# Patient Record
Sex: MALE | Race: WHITE | NOT HISPANIC OR LATINO | Employment: FULL TIME | URBAN - METROPOLITAN AREA
[De-identification: names, ages, dates, MRNs, and addresses within clinical notes are randomized per-mention and may not be internally consistent; named-entity substitution may affect disease eponyms.]

---

## 2024-06-02 ENCOUNTER — APPOINTMENT (OUTPATIENT)
Dept: CT IMAGING | Facility: CLINIC | Age: 59
DRG: 063 | End: 2024-06-02
Attending: EMERGENCY MEDICINE
Payer: COMMERCIAL

## 2024-06-02 ENCOUNTER — HOSPITAL ENCOUNTER (INPATIENT)
Facility: CLINIC | Age: 59
LOS: 2 days | Discharge: HOME | DRG: 063 | End: 2024-06-05
Attending: EMERGENCY MEDICINE | Admitting: HOSPITALIST
Payer: COMMERCIAL

## 2024-06-02 DIAGNOSIS — R47.1 DYSARTHRIA: ICD-10-CM

## 2024-06-02 DIAGNOSIS — R29.810 FACIAL DROOP: ICD-10-CM

## 2024-06-02 DIAGNOSIS — R53.1 RIGHT SIDED WEAKNESS: ICD-10-CM

## 2024-06-02 DIAGNOSIS — R47.01 EXPRESSIVE APHASIA: ICD-10-CM

## 2024-06-02 DIAGNOSIS — I48.91 ATRIAL FIBRILLATION WITH RVR (H): ICD-10-CM

## 2024-06-02 DIAGNOSIS — I63.9 ACUTE CVA (CEREBROVASCULAR ACCIDENT) (H): Primary | ICD-10-CM

## 2024-06-02 LAB
ANION GAP SERPL CALCULATED.3IONS-SCNC: 13 MMOL/L (ref 7–15)
APTT PPP: 32 SECONDS (ref 22–38)
BASOPHILS # BLD AUTO: 0.1 10E3/UL (ref 0–0.2)
BASOPHILS NFR BLD AUTO: 1 %
BUN SERPL-MCNC: 14.8 MG/DL (ref 8–23)
CALCIUM SERPL-MCNC: 8.9 MG/DL (ref 8.6–10)
CHLORIDE SERPL-SCNC: 106 MMOL/L (ref 98–107)
CREAT SERPL-MCNC: 1.1 MG/DL (ref 0.67–1.17)
DEPRECATED HCO3 PLAS-SCNC: 22 MMOL/L (ref 22–29)
EGFRCR SERPLBLD CKD-EPI 2021: 77 ML/MIN/1.73M2
EOSINOPHIL # BLD AUTO: 0.2 10E3/UL (ref 0–0.7)
EOSINOPHIL NFR BLD AUTO: 2 %
ERYTHROCYTE [DISTWIDTH] IN BLOOD BY AUTOMATED COUNT: 12.3 % (ref 10–15)
GLUCOSE SERPL-MCNC: 125 MG/DL (ref 70–99)
HCT VFR BLD AUTO: 45.7 % (ref 40–53)
HGB BLD-MCNC: 16.2 G/DL (ref 13.3–17.7)
IMM GRANULOCYTES # BLD: 0.1 10E3/UL
IMM GRANULOCYTES NFR BLD: 1 %
INR PPP: 1.15 (ref 0.85–1.15)
LYMPHOCYTES # BLD AUTO: 3.1 10E3/UL (ref 0.8–5.3)
LYMPHOCYTES NFR BLD AUTO: 25 %
MCH RBC QN AUTO: 30.3 PG (ref 26.5–33)
MCHC RBC AUTO-ENTMCNC: 35.4 G/DL (ref 31.5–36.5)
MCV RBC AUTO: 86 FL (ref 78–100)
MONOCYTES # BLD AUTO: 1.3 10E3/UL (ref 0–1.3)
MONOCYTES NFR BLD AUTO: 10 %
NEUTROPHILS # BLD AUTO: 7.7 10E3/UL (ref 1.6–8.3)
NEUTROPHILS NFR BLD AUTO: 61 %
NRBC # BLD AUTO: 0 10E3/UL
NRBC BLD AUTO-RTO: 0 /100
PLATELET # BLD AUTO: 128 10E3/UL (ref 150–450)
POTASSIUM SERPL-SCNC: 4 MMOL/L (ref 3.4–5.3)
RBC # BLD AUTO: 5.34 10E6/UL (ref 4.4–5.9)
SODIUM SERPL-SCNC: 141 MMOL/L (ref 135–145)
TROPONIN T SERPL HS-MCNC: 57 NG/L
WBC # BLD AUTO: 12.4 10E3/UL (ref 4–11)

## 2024-06-02 PROCEDURE — 96374 THER/PROPH/DIAG INJ IV PUSH: CPT

## 2024-06-02 PROCEDURE — 85730 THROMBOPLASTIN TIME PARTIAL: CPT | Performed by: EMERGENCY MEDICINE

## 2024-06-02 PROCEDURE — 70450 CT HEAD/BRAIN W/O DYE: CPT

## 2024-06-02 PROCEDURE — 99291 CRITICAL CARE FIRST HOUR: CPT | Mod: G0 | Performed by: PSYCHIATRY & NEUROLOGY

## 2024-06-02 PROCEDURE — 84484 ASSAY OF TROPONIN QUANT: CPT | Performed by: EMERGENCY MEDICINE

## 2024-06-02 PROCEDURE — 99291 CRITICAL CARE FIRST HOUR: CPT | Mod: 25

## 2024-06-02 PROCEDURE — 70496 CT ANGIOGRAPHY HEAD: CPT

## 2024-06-02 PROCEDURE — 250N000011 HC RX IP 250 OP 636: Performed by: EMERGENCY MEDICINE

## 2024-06-02 PROCEDURE — 85025 COMPLETE CBC W/AUTO DIFF WBC: CPT | Performed by: EMERGENCY MEDICINE

## 2024-06-02 PROCEDURE — 83036 HEMOGLOBIN GLYCOSYLATED A1C: CPT | Performed by: HOSPITALIST

## 2024-06-02 PROCEDURE — 80048 BASIC METABOLIC PNL TOTAL CA: CPT | Performed by: EMERGENCY MEDICINE

## 2024-06-02 PROCEDURE — 250N000011 HC RX IP 250 OP 636: Performed by: STUDENT IN AN ORGANIZED HEALTH CARE EDUCATION/TRAINING PROGRAM

## 2024-06-02 PROCEDURE — 82465 ASSAY BLD/SERUM CHOLESTEROL: CPT | Performed by: HOSPITALIST

## 2024-06-02 PROCEDURE — 85610 PROTHROMBIN TIME: CPT | Performed by: EMERGENCY MEDICINE

## 2024-06-02 PROCEDURE — 250N000009 HC RX 250: Performed by: STUDENT IN AN ORGANIZED HEALTH CARE EDUCATION/TRAINING PROGRAM

## 2024-06-02 PROCEDURE — 36415 COLL VENOUS BLD VENIPUNCTURE: CPT | Performed by: EMERGENCY MEDICINE

## 2024-06-02 PROCEDURE — 250N000009 HC RX 250: Performed by: EMERGENCY MEDICINE

## 2024-06-02 RX ORDER — LABETALOL HYDROCHLORIDE 5 MG/ML
10 INJECTION, SOLUTION INTRAVENOUS EVERY 10 MIN PRN
Status: DISCONTINUED | OUTPATIENT
Start: 2024-06-02 | End: 2024-06-05 | Stop reason: HOSPADM

## 2024-06-02 RX ORDER — HYDRALAZINE HYDROCHLORIDE 20 MG/ML
10 INJECTION INTRAMUSCULAR; INTRAVENOUS EVERY 10 MIN PRN
Status: DISCONTINUED | OUTPATIENT
Start: 2024-06-02 | End: 2024-06-05 | Stop reason: HOSPADM

## 2024-06-02 RX ORDER — SODIUM CHLORIDE 9 MG/ML
INJECTION, SOLUTION INTRAVENOUS CONTINUOUS PRN
Status: DISCONTINUED | OUTPATIENT
Start: 2024-06-02 | End: 2024-06-05 | Stop reason: HOSPADM

## 2024-06-02 RX ORDER — IOPAMIDOL 755 MG/ML
67 INJECTION, SOLUTION INTRAVASCULAR ONCE
Status: COMPLETED | OUTPATIENT
Start: 2024-06-02 | End: 2024-06-02

## 2024-06-02 RX ORDER — METOPROLOL SUCCINATE 50 MG/1
50 TABLET, EXTENDED RELEASE ORAL DAILY
Status: ON HOLD | COMMUNITY
End: 2024-06-05

## 2024-06-02 RX ADMIN — SODIUM CHLORIDE 100 ML: 9 INJECTION, SOLUTION INTRAVENOUS at 23:28

## 2024-06-02 RX ADMIN — IOPAMIDOL 67 ML: 755 INJECTION, SOLUTION INTRAVENOUS at 23:28

## 2024-06-02 RX ADMIN — Medication 25 MG: at 23:58

## 2024-06-03 ENCOUNTER — APPOINTMENT (OUTPATIENT)
Dept: CT IMAGING | Facility: CLINIC | Age: 59
DRG: 063 | End: 2024-06-03
Attending: NURSE PRACTITIONER
Payer: COMMERCIAL

## 2024-06-03 ENCOUNTER — APPOINTMENT (OUTPATIENT)
Dept: OCCUPATIONAL THERAPY | Facility: CLINIC | Age: 59
DRG: 063 | End: 2024-06-03
Attending: HOSPITALIST
Payer: COMMERCIAL

## 2024-06-03 ENCOUNTER — APPOINTMENT (OUTPATIENT)
Dept: CARDIOLOGY | Facility: CLINIC | Age: 59
DRG: 063 | End: 2024-06-03
Attending: HOSPITALIST
Payer: COMMERCIAL

## 2024-06-03 ENCOUNTER — APPOINTMENT (OUTPATIENT)
Dept: CT IMAGING | Facility: CLINIC | Age: 59
DRG: 063 | End: 2024-06-03
Attending: HOSPITALIST
Payer: COMMERCIAL

## 2024-06-03 ENCOUNTER — APPOINTMENT (OUTPATIENT)
Dept: MRI IMAGING | Facility: CLINIC | Age: 59
DRG: 063 | End: 2024-06-03
Attending: HOSPITALIST
Payer: COMMERCIAL

## 2024-06-03 PROBLEM — I63.9 ACUTE CVA (CEREBROVASCULAR ACCIDENT) (H): Status: ACTIVE | Noted: 2024-06-03

## 2024-06-03 LAB
ANION GAP SERPL CALCULATED.3IONS-SCNC: 14 MMOL/L (ref 7–15)
ATRIAL RATE - MUSE: NORMAL BPM
BASOPHILS # BLD AUTO: 0.1 10E3/UL (ref 0–0.2)
BASOPHILS NFR BLD AUTO: 1 %
BUN SERPL-MCNC: 14.3 MG/DL (ref 8–23)
CALCIUM SERPL-MCNC: 9 MG/DL (ref 8.6–10)
CHLORIDE SERPL-SCNC: 106 MMOL/L (ref 98–107)
CHOLEST SERPL-MCNC: 180 MG/DL
CREAT SERPL-MCNC: 1.03 MG/DL (ref 0.67–1.17)
DEPRECATED HCO3 PLAS-SCNC: 20 MMOL/L (ref 22–29)
DIASTOLIC BLOOD PRESSURE - MUSE: NORMAL MMHG
EGFRCR SERPLBLD CKD-EPI 2021: 84 ML/MIN/1.73M2
EOSINOPHIL # BLD AUTO: 0.2 10E3/UL (ref 0–0.7)
EOSINOPHIL NFR BLD AUTO: 2 %
ERYTHROCYTE [DISTWIDTH] IN BLOOD BY AUTOMATED COUNT: 12.7 % (ref 10–15)
GLUCOSE BLDC GLUCOMTR-MCNC: 105 MG/DL (ref 70–99)
GLUCOSE BLDC GLUCOMTR-MCNC: 109 MG/DL (ref 70–99)
GLUCOSE BLDC GLUCOMTR-MCNC: 112 MG/DL (ref 70–99)
GLUCOSE SERPL-MCNC: 141 MG/DL (ref 70–99)
HBA1C MFR BLD: 5.1 %
HCT VFR BLD AUTO: 49.1 % (ref 40–53)
HDLC SERPL-MCNC: 29 MG/DL
HGB BLD-MCNC: 17.2 G/DL (ref 13.3–17.7)
IMM GRANULOCYTES # BLD: 0.1 10E3/UL
IMM GRANULOCYTES NFR BLD: 1 %
INTERPRETATION ECG - MUSE: NORMAL
LDLC SERPL CALC-MCNC: 106 MG/DL
LVEF ECHO: NORMAL
LYMPHOCYTES # BLD AUTO: 3.1 10E3/UL (ref 0.8–5.3)
LYMPHOCYTES NFR BLD AUTO: 24 %
MCH RBC QN AUTO: 30.2 PG (ref 26.5–33)
MCHC RBC AUTO-ENTMCNC: 35 G/DL (ref 31.5–36.5)
MCV RBC AUTO: 86 FL (ref 78–100)
MONOCYTES # BLD AUTO: 0.9 10E3/UL (ref 0–1.3)
MONOCYTES NFR BLD AUTO: 7 %
NEUTROPHILS # BLD AUTO: 8.5 10E3/UL (ref 1.6–8.3)
NEUTROPHILS NFR BLD AUTO: 65 %
NONHDLC SERPL-MCNC: 151 MG/DL
NRBC # BLD AUTO: 0 10E3/UL
NRBC BLD AUTO-RTO: 0 /100
P AXIS - MUSE: NORMAL DEGREES
PLATELET # BLD AUTO: 131 10E3/UL (ref 150–450)
POTASSIUM SERPL-SCNC: 4.1 MMOL/L (ref 3.4–5.3)
PR INTERVAL - MUSE: NORMAL MS
QRS DURATION - MUSE: 90 MS
QT - MUSE: 366 MS
QTC - MUSE: 488 MS
R AXIS - MUSE: -9 DEGREES
RBC # BLD AUTO: 5.7 10E6/UL (ref 4.4–5.9)
SODIUM SERPL-SCNC: 140 MMOL/L (ref 135–145)
SYSTOLIC BLOOD PRESSURE - MUSE: NORMAL MMHG
T AXIS - MUSE: 43 DEGREES
TRIGL SERPL-MCNC: 223 MG/DL
TROPONIN T SERPL HS-MCNC: 49 NG/L
VENTRICULAR RATE- MUSE: 107 BPM
WBC # BLD AUTO: 12.9 10E3/UL (ref 4–11)

## 2024-06-03 PROCEDURE — 36415 COLL VENOUS BLD VENIPUNCTURE: CPT | Performed by: HOSPITALIST

## 2024-06-03 PROCEDURE — 97165 OT EVAL LOW COMPLEX 30 MIN: CPT | Mod: GO

## 2024-06-03 PROCEDURE — 999N000226 HC STATISTIC SLP IP EVAL DEFER: Performed by: SPEECH-LANGUAGE PATHOLOGIST

## 2024-06-03 PROCEDURE — 255N000002 HC RX 255 OP 636: Performed by: HOSPITALIST

## 2024-06-03 PROCEDURE — 250N000009 HC RX 250: Performed by: HOSPITALIST

## 2024-06-03 PROCEDURE — 250N000009 HC RX 250: Performed by: EMERGENCY MEDICINE

## 2024-06-03 PROCEDURE — 97535 SELF CARE MNGMENT TRAINING: CPT | Mod: GO

## 2024-06-03 PROCEDURE — 99223 1ST HOSP IP/OBS HIGH 75: CPT | Performed by: HOSPITALIST

## 2024-06-03 PROCEDURE — 93005 ELECTROCARDIOGRAM TRACING: CPT

## 2024-06-03 PROCEDURE — 85004 AUTOMATED DIFF WBC COUNT: CPT | Performed by: HOSPITALIST

## 2024-06-03 PROCEDURE — 84484 ASSAY OF TROPONIN QUANT: CPT | Performed by: HOSPITALIST

## 2024-06-03 PROCEDURE — 999N000147 HC STATISTIC PT IP EVAL DEFER: Performed by: PHYSICAL THERAPIST

## 2024-06-03 PROCEDURE — 93306 TTE W/DOPPLER COMPLETE: CPT | Mod: 26 | Performed by: INTERNAL MEDICINE

## 2024-06-03 PROCEDURE — 70551 MRI BRAIN STEM W/O DYE: CPT

## 2024-06-03 PROCEDURE — 80048 BASIC METABOLIC PNL TOTAL CA: CPT | Performed by: HOSPITALIST

## 2024-06-03 PROCEDURE — 3E03317 INTRODUCTION OF OTHER THROMBOLYTIC INTO PERIPHERAL VEIN, PERCUTANEOUS APPROACH: ICD-10-PCS | Performed by: HOSPITALIST

## 2024-06-03 PROCEDURE — 96375 TX/PRO/DX INJ NEW DRUG ADDON: CPT

## 2024-06-03 PROCEDURE — 99291 CRITICAL CARE FIRST HOUR: CPT | Mod: FS | Performed by: NURSE PRACTITIONER

## 2024-06-03 PROCEDURE — 250N000013 HC RX MED GY IP 250 OP 250 PS 637: Performed by: HOSPITALIST

## 2024-06-03 PROCEDURE — 200N000001 HC R&B ICU

## 2024-06-03 PROCEDURE — C8929 TTE W OR WO FOL WCON,DOPPLER: HCPCS

## 2024-06-03 PROCEDURE — 70450 CT HEAD/BRAIN W/O DYE: CPT

## 2024-06-03 PROCEDURE — 250N000011 HC RX IP 250 OP 636: Performed by: HOSPITALIST

## 2024-06-03 PROCEDURE — 70496 CT ANGIOGRAPHY HEAD: CPT

## 2024-06-03 RX ORDER — HYDRALAZINE HYDROCHLORIDE 20 MG/ML
10-20 INJECTION INTRAMUSCULAR; INTRAVENOUS EVERY 30 MIN PRN
Status: DISCONTINUED | OUTPATIENT
Start: 2024-06-03 | End: 2024-06-05 | Stop reason: HOSPADM

## 2024-06-03 RX ORDER — MULTIVITAMIN,THERAPEUTIC
1 TABLET ORAL
COMMUNITY

## 2024-06-03 RX ORDER — LIDOCAINE 40 MG/G
CREAM TOPICAL
Status: DISCONTINUED | OUTPATIENT
Start: 2024-06-03 | End: 2024-06-05 | Stop reason: HOSPADM

## 2024-06-03 RX ORDER — LABETALOL HYDROCHLORIDE 5 MG/ML
10-20 INJECTION, SOLUTION INTRAVENOUS EVERY 10 MIN PRN
Status: DISCONTINUED | OUTPATIENT
Start: 2024-06-03 | End: 2024-06-05 | Stop reason: HOSPADM

## 2024-06-03 RX ORDER — METOPROLOL TARTRATE 1 MG/ML
5 INJECTION, SOLUTION INTRAVENOUS ONCE
Status: COMPLETED | OUTPATIENT
Start: 2024-06-03 | End: 2024-06-03

## 2024-06-03 RX ORDER — ACETAMINOPHEN 325 MG/10.15ML
650 LIQUID ORAL EVERY 4 HOURS PRN
Status: DISCONTINUED | OUTPATIENT
Start: 2024-06-03 | End: 2024-06-05 | Stop reason: HOSPADM

## 2024-06-03 RX ORDER — METOPROLOL SUCCINATE 50 MG/1
50 TABLET, EXTENDED RELEASE ORAL DAILY
Status: DISCONTINUED | OUTPATIENT
Start: 2024-06-03 | End: 2024-06-04

## 2024-06-03 RX ORDER — METOPROLOL TARTRATE 1 MG/ML
5 INJECTION, SOLUTION INTRAVENOUS EVERY 5 MIN PRN
Status: DISCONTINUED | OUTPATIENT
Start: 2024-06-03 | End: 2024-06-05 | Stop reason: HOSPADM

## 2024-06-03 RX ORDER — CHLORAL HYDRATE 500 MG
1 CAPSULE ORAL
COMMUNITY

## 2024-06-03 RX ORDER — VITAMIN B COMPLEX
1 TABLET ORAL
COMMUNITY

## 2024-06-03 RX ORDER — IOPAMIDOL 755 MG/ML
67 INJECTION, SOLUTION INTRAVASCULAR ONCE
Status: COMPLETED | OUTPATIENT
Start: 2024-06-03 | End: 2024-06-03

## 2024-06-03 RX ORDER — ACETAMINOPHEN 325 MG/1
650 TABLET ORAL EVERY 4 HOURS PRN
Status: DISCONTINUED | OUTPATIENT
Start: 2024-06-03 | End: 2024-06-05 | Stop reason: HOSPADM

## 2024-06-03 RX ADMIN — METOPROLOL TARTRATE 5 MG: 5 INJECTION INTRAVENOUS at 00:07

## 2024-06-03 RX ADMIN — HUMAN ALBUMIN MICROSPHERES AND PERFLUTREN 3 ML: 10; .22 INJECTION, SOLUTION INTRAVENOUS at 14:14

## 2024-06-03 RX ADMIN — SODIUM CHLORIDE 90 ML: 9 INJECTION, SOLUTION INTRAVENOUS at 23:39

## 2024-06-03 RX ADMIN — SERTRALINE HYDROCHLORIDE 50 MG: 50 TABLET ORAL at 20:33

## 2024-06-03 RX ADMIN — METOPROLOL SUCCINATE 50 MG: 50 TABLET, EXTENDED RELEASE ORAL at 10:20

## 2024-06-03 RX ADMIN — IOPAMIDOL 67 ML: 755 INJECTION, SOLUTION INTRAVENOUS at 23:39

## 2024-06-03 ASSESSMENT — ACTIVITIES OF DAILY LIVING (ADL)
ADLS_ACUITY_SCORE: 35
ADLS_ACUITY_SCORE: 24
ADLS_ACUITY_SCORE: 24
ADLS_ACUITY_SCORE: 25
ADLS_ACUITY_SCORE: 24
ADLS_ACUITY_SCORE: 24
ADLS_ACUITY_SCORE: 35
ADLS_ACUITY_SCORE: 25
ADLS_ACUITY_SCORE: 35
ADLS_ACUITY_SCORE: 24
ADLS_ACUITY_SCORE: 25
ADLS_ACUITY_SCORE: 24
ADLS_ACUITY_SCORE: 25
ADLS_ACUITY_SCORE: 25
ADLS_ACUITY_SCORE: 24
ADLS_ACUITY_SCORE: 25
ADLS_ACUITY_SCORE: 35
ADLS_ACUITY_SCORE: 24
ADLS_ACUITY_SCORE: 35
ADLS_ACUITY_SCORE: 20
ADLS_ACUITY_SCORE: 24
ADLS_ACUITY_SCORE: 35
ADLS_ACUITY_SCORE: 25

## 2024-06-03 NOTE — ED TRIAGE NOTES
LKW 0634. Wife noticed patient had an unsteady gait and trouble talking with right sided weakness. EMS found patient to be in AFIB with RVR.

## 2024-06-03 NOTE — PHARMACY-ADMISSION MEDICATION HISTORY
Pharmacist Admission Medication History    Admission medication history is complete. The information provided in this note is only as accurate as the sources available at the time of the update.    Information Source(s): Patient, Family member, and CareEverywhere/SureScripts via in-person    Pertinent Information: None    Changes made to PTA medication list:  Added: vitamins  Deleted: None  Changed: None    Allergies reviewed with patient and updates made in EHR: yes    Medication History Completed By: Starr Meza RPH 6/3/2024 10:06 AM    PTA Med List   Medication Sig Last Dose    fish oil-omega-3 fatty acids 1000 MG capsule Take 1 g by mouth daily (with dinner) 6/1/2024    metoprolol succinate ER (TOPROL XL) 50 MG 24 hr tablet Take 50 mg by mouth daily 6/1/2024 at pm    multivitamin, therapeutic (THERA-VIT) TABS tablet Take 1 tablet by mouth daily (with dinner) 6/1/2024    sertraline (ZOLOFT) 50 MG tablet Take 50 mg by mouth daily 6/1/2024 at pm    Vitamin D3 (CHOLECALCIFEROL) 25 mcg (1000 units) tablet Take 1 tablet by mouth daily (with dinner) 6/1/2024

## 2024-06-03 NOTE — PLAN OF CARE
"Neuros intact. Afib controlled. BP WDL. On room air. Reg diet tolerated well. Up with SBA to BR. CT and MRI tonight.     Problem: Adult Inpatient Plan of Care  Goal: Plan of Care Review  Description: The Plan of Care Review/Shift note should be completed every shift.  The Outcome Evaluation is a brief statement about your assessment that the patient is improving, declining, or no change.  This information will be displayed automatically on your shift  note.  Outcome: Progressing  Flowsheets (Taken 6/3/2024 1829)  Plan of Care Reviewed With:   patient   spouse  Overall Patient Progress: improving  Goal: Patient-Specific Goal (Individualized)  Description: You can add care plan individualizations to a care plan. Examples of Individualization might be:  \"Parent requests to be called daily at 9am for status\", \"I have a hard time hearing out of my right ear\", or \"Do not touch me to wake me up as it startles  me\".  Outcome: Progressing  Goal: Absence of Hospital-Acquired Illness or Injury  Outcome: Progressing  Intervention: Identify and Manage Fall Risk  Recent Flowsheet Documentation  Taken 6/3/2024 1600 by Haris Kearney RN  Safety Promotion/Fall Prevention:   activity supervised   treat reversible contributory factors   treat underlying cause  Intervention: Prevent Skin Injury  Recent Flowsheet Documentation  Taken 6/3/2024 1800 by Haris Kearney RN  Body Position:   weight shifting   position changed independently  Taken 6/3/2024 1600 by Haris Kearney RN  Body Position:   weight shifting   position changed independently  Taken 6/3/2024 1400 by Haris Kearney RN  Body Position:   weight shifting   position changed independently  Intervention: Prevent Infection  Recent Flowsheet Documentation  Taken 6/3/2024 1600 by Haris Kearney RN  Infection Prevention:   environmental surveillance performed   equipment surfaces disinfected   hand hygiene promoted   personal " protective equipment utilized   rest/sleep promoted   single patient room provided  Goal: Optimal Comfort and Wellbeing  Outcome: Progressing  Intervention: Provide Person-Centered Care  Recent Flowsheet Documentation  Taken 6/3/2024 1600 by Haris Kearney RN  Trust Relationship/Rapport:   care explained   choices provided   emotional support provided   empathic listening provided   questions answered   questions encouraged   reassurance provided   thoughts/feelings acknowledged  Goal: Readiness for Transition of Care  Outcome: Progressing     Problem: Stroke, Ischemic (Includes Transient Ischemic Attack)  Goal: Improved Communication Skills  Outcome: Progressing  Goal: Optimal Functional Ability  Outcome: Progressing  Intervention: Optimize Functional Ability  Recent Flowsheet Documentation  Taken 6/3/2024 1800 by Haris Kearney RN  Activity Management: ambulated to bathroom  Taken 6/3/2024 1600 by Haris Kearney RN  Activity Management: ambulated to bathroom  Goal: Effective Oxygenation and Ventilation  Outcome: Progressing  Intervention: Optimize Oxygenation and Ventilation  Recent Flowsheet Documentation  Taken 6/3/2024 1800 by Haris Kearney RN  Head of Bed (HOB) Positioning: HOB at 20-30 degrees  Taken 6/3/2024 1600 by Haris Kearney RN  Head of Bed (HOB) Positioning: HOB at 20-30 degrees  Taken 6/3/2024 1400 by Haris Kearney RN  Head of Bed (HOB) Positioning: HOB at 20-30 degrees  Goal: Effective Urinary Elimination  Outcome: Progressing     Problem: Risk for Delirium  Goal: Optimal Coping  Outcome: Progressing  Intervention: Optimize Psychosocial Adjustment to Delirium  Recent Flowsheet Documentation  Taken 6/3/2024 1600 by Haris Kearney RN  Family/Support System Care:   caregiver stress acknowledged   self-care encouraged   support provided  Goal: Improved Behavioral Control  Outcome: Progressing  Intervention: Minimize Safety  Risk  Recent Flowsheet Documentation  Taken 6/3/2024 1600 by Haris Kearney RN  Trust Relationship/Rapport:   care explained   choices provided   emotional support provided   empathic listening provided   questions answered   questions encouraged   reassurance provided   thoughts/feelings acknowledged  Goal: Improved Attention and Thought Clarity  Outcome: Progressing  Goal: Improved Sleep  Outcome: Progressing     Problem: Comorbidity Management  Goal: Blood Pressure in Desired Range  Outcome: Progressing   Goal Outcome Evaluation:      Plan of Care Reviewed With: patient, spouse    Overall Patient Progress: improvingOverall Patient Progress: improving

## 2024-06-03 NOTE — PHARMACY-CONSULT NOTE
Pharmacy Consult to evaluate for medication related stroke core measures    Dave Cole, 59 year old male admitted for CVA on 6/2/2024.    Thrombolytic was given on 6-2-24  at 2358.    VTE Prophylaxis SCDs /PCDs placed on 6-3-24, as appropriate prior to end of hospital day 2.    Antithrombotic:  have not been  started yet, as appropriate by end of hospital day 2. Continue antithrombotic therapy on discharge to meet quality measures, unless contraindicated.    Anticoagulation if history of A-fib/flutter: Patient has history of afib, has not been started on Anticoag yet    LDL Cholesterol Calculated   Date Value Ref Range Status   06/02/2024 106 (H) <=100 mg/dL Final       Patient is not currently on a statin.    Recommendations: Start statin and AC when appropriate per neurology service    Thank you for the consult.    Florencio Pisano [facility] 6/3/2024 12:44 PM

## 2024-06-03 NOTE — PROGRESS NOTES
06/03/24 0900   Appointment Info   Signing Clinician's Name / Credentials (OT) Alina Mendez, OTR/L   Living Environment   People in Home spouse   Current Living Arrangements house   Home Accessibility stairs to enter home;stairs within home   Number of Stairs, Main Entrance 3   Number of Stairs, Within Home, Primary greater than 10 stairs  (flight up to bed/bath)   Stair Railings, Within Home, Primary railings safe and in good condition   Transportation Anticipated family or friend will provide   Living Environment Comments Pt lives at home w/ spouse, 3 PAGE and flight up to bed/bath. Pt has walk in shower   Self-Care   Usual Activity Tolerance good   Current Activity Tolerance moderate   Regular Exercise Yes   Activity/Exercise Type strength training   Equipment Currently Used at Home none   Fall history within last six months no   Activity/Exercise/Self-Care Comment Pt typically independent w/ ADL tasks and mobility w/out AD   Instrumental Activities of Daily Living (IADL)   IADL Comments Pt independent, drives and works.   General Information   Onset of Illness/Injury or Date of Surgery 06/02/24   Referring Physician Gabriela Schuler MD   Patient/Family Therapy Goal Statement (OT) go home   Additional Occupational Profile Info/Pertinent History of Current Problem Dave Cole is a 59 year old male with a past medical history of paroxysmal atrial fibrillation presents to hospital for right-sided weakness. The patient noticed that he was back in atrial fibrillation earlier on the day of presentation due to dyspnea and palpitations. In the evening as he was getting ready for bed he noticed that he is having difficulty opening the bathroom door and had to use his left arm. While walking to his side of the bedroom his wife noticed that he was stumbling and bumping into things.  His speech was garbled.  At that point his wife called EMS and they brought him to the hospital. Patient was noted to be in A-fib  RVR. CT of the head revealed a filling defect in the M1 segment of the left middle cerebral artery with increased density in the same area seen on CT concerning for an acute clot. S/p TNK.   Existing Precautions/Restrictions fall   General Observations and Info pt in afib, resting rates 130s-140s   Cognitive Status Examination   Orientation Status orientation to person, place and time   Visual Perception   Visual Impairment/Limitations WFL   Impact of Vision Impairment on Function (Vision) Pt denies changes in vision, peripheral vision, tracking, saccades and conversion all intact   Sensory   Sensory Quick Adds sensation intact   Pain Assessment   Patient Currently in Pain No   Posture   Posture not impaired   Range of Motion Comprehensive   Comment, General Range of Motion BUE WFL   Strength Comprehensive (MMT)   Comment, General Manual Muscle Testing (MMT) Assessment BUE WFL 5/5   Muscle Tone Assessment   Muscle Tone Quick Adds No deficits were identified   Coordination   Upper Extremity Coordination No deficits were identified   Transfers   Transfers sit-stand transfer   Sit-Stand Transfer   Sit-Stand [place] (Transfers) supervision   Balance   Balance Comments SBA w/out AD, steady   Activities of Daily Living   BADL Assessment/Intervention bathing;upper body dressing;lower body dressing;grooming;toileting   Bathing Assessment/Intervention   [place] Level (Bathing) supervision   Comment, (Bathing) per clinical judgement   Upper Body Dressing Assessment/Training   Comment, (Upper Body Dressing) per clinical judgement   [place] Level (Upper Body Dressing) independent   Lower Body Dressing Assessment/Training   Comment, (Lower Body Dressing) per clinical judgement   [place] Level (Lower Body Dressing) independent   Grooming Assessment/Training   [place] Level (Grooming) supervision   Toileting   Comment, (Toileting) per clinical judgement   [place] Level (Toileting) supervision    Clinical Impression   Criteria for Skilled Therapeutic Interventions Met (OT) Yes, treatment indicated   OT Diagnosis decreased tolerance for I/ADL tasks   OT Problem List-Impairments impacting ADL problems related to;activity tolerance impaired   Assessment of Occupational Performance 3-5 Performance Deficits   Identified Performance Deficits decreased tolerance for functional mobility, I/ADL tasks   Planned Therapy Interventions (OT) ADL retraining;IADL retraining;home program guidelines;progressive activity/exercise;risk factor education   Clinical Decision Making Complexity (OT) problem focused assessment/low complexity   Risk & Benefits of therapy have been explained evaluation/treatment results reviewed;care plan/treatment goals reviewed;risks/benefits reviewed;current/potential barriers reviewed;participants voiced agreement with care plan;participants included;patient;spouse/significant other   OT Total Evaluation Time   OT Eval, Low Complexity Minutes (39973) 10   OT Goals   Therapy Frequency (OT) Daily   OT Predicted Duration/Target Date for Goal Attainment 06/07/24   OT Goals Hygiene/Grooming;Upper Body Dressing;Lower Body Dressing;Toilet Transfer/Toileting;OT Goal 1;OT Goal 2   OT: Hygiene/Grooming modified independent;while standing   OT: Upper Body Dressing Modified independent;including set-up/clothing retrieval   OT: Lower Body Dressing Modified independent;including set-up/clothing retrieval   OT: Toilet Transfer/Toileting Modified independent;cleaning and garment management;toilet transfer   OT: Goal 1 Pt will ambulate ~350ft in hallway w/ SBA and stable CV response to facilitate safety at d/c.   OT: Goal 2 Pt will ascend/descend 12 stairs w/ SBA to facilitate safety at d/c.   Interventions   Interventions Quick Adds Self-Care/Home Management   Self-Care/Home Management   Self-Care/Home Mgmt/ADL, Compensatory, Meal Prep Minutes (47612) 10   Symptoms Noted During/After Treatment (Meal  Preparation/Planning Training) significant change in vital signs   Treatment Detail/Skilled Intervention Pt in chair upon arrival, pleasant and agreeable to OT. Pt reports feeling back to baseline, symptoms have resolved per his report. HR in 130s-40s at rest in afib, RN aware. BP 96/65. Pt ind w/ LB dressing from chair. Pt SBA to stand from chair and walk to the sink. Pt completed 2 g/h tasks w/ SBA, HR up to 158 w/ standing tasks so further activity deferred. Walked back to chair w/ SBA, BP 85/70. Pt asypmtomatic, BLE elevated and RN updated. Left up in chair w/ all needs met, alarm on and RN updated.   OT Discharge Planning   OT Plan ambulate pending HR, stairs, ADL routine, likely only 1-2 more sessions needed   OT Discharge Recommendation (DC Rec) home with assist   OT Rationale for DC Rec Pt below baseline, limited by decreased activity tolerance. HR increasing to 150s w/ minimal activity today. Pending medical stability and progress w/ IP therapy, anticipate pt will be safe for return home w/ family assist for heavy IADL tasks and driving.   OT Brief overview of current status HR up to 150s standing at sink for g/h, SBA in room   Total Session Time   Timed Code Treatment Minutes 10   Total Session Time (sum of timed and untimed services) 20

## 2024-06-03 NOTE — ED NOTES
Bed: ED05  Expected date:   Expected time:   Means of arrival:   Comments:  Marli 3 59M right side weakness

## 2024-06-03 NOTE — PLAN OF CARE
Physical Therapy: Orders received. Chart reviewed and discussed pt mobility with evaluating occupational therapist.? Physical Therapy not indicated due to current mobility, ADL and education needs are being met by Occupational Therapy..? Defer discharge recommendations to Occupational Therapy.? Will complete PT order at this time. OT will request PT orders again, should needs arise. Pt is currently SBA at this time.

## 2024-06-03 NOTE — CONSULTS
[facility]    Stroke Consult Note    Reason for Consult:  Stroke    Chief Complaint: Stroke Symptoms      HPI  Dave Cole is a 59 year old male with past medical history significant for atrial fibrillation (not on anticoagulation) and thoracic aortic aneurysm who presents to the hospital for acute right-sided weakness at 2245 last night. Patient's wife reports weakness of his right face, upper and lower extremities at that time, as he had difficulty opening the bathroom door and getting into bed. Difficulty speaking and slurred speech are also noted. LKW at 2230 last night, while entering the bathroom. Symptoms reportedly improved in ED with TNKase per wife. Patient states that he feels better this morning and denies any further complaints. CTA head/neck significant for L M1 subocclusive thrombus and L M2 occlusion.     Patient reports multiple episodes of palpitations and shortness of breath associated with his atrial fibrillation, last occurring about 2 years ago. He endorses a previous Holter monitor with the first of these episodes.     Stroke Evaluation Summarized    MRI/Head CT CT head negative for ischemia or hemorrhage   Intracranial Vasculature CTA head significant for:  Filling defect 4 mm in length M1 segment left middle cerebral artery  Vessel cutoff at junction of the posterior division branch M2 and M3 region of the left middle cerebral artery with no contrast distally   Cervical Vasculature CTA neck without significant stenosis or dissection   `  Echocardiogram In progress   EKG/Telemetry Afib with RVR with PVC or ACC   Other Testing Not Applicable     LDL  6/2/2024: 106 mg/dL   A1C  6/2/2024: 5.1 %   Troponin 6/3/2024: 49 ng/L       Impression  Acute ischemic stroke of left middle cerebral artery due to cardioembolism in the setting of atrial fibrillation, not on anticoagulation.     Recommendations   - Neurochecks and vital signs per post-thrombolytic orders and  "monitor closely for any evidence of CNS hemorrhage, bleeding, or orolingual angioedema  - Hold all antithrombotic and anticoagulant medications for 24 hrs post-thrombolytic  - Hold pharmacologic VTE prophylaxis for 24 hrs post-thrombolytic  - Statin:   (goal <70), will start Lipitor 40 mg daily  - Maintain BP <180/105 until 24 hours post TNK. Long term goal <130/80  - Bedside Glucose Monitoring  - PT/OT/SLP  - Stroke Education    In Progress:  - Echocardiogram pending  - MRI Brain w/o Contrast pending  - Repeat Head CT 24 hrs post-thrombolytic, will also obtain CTA to evaluate for recanalization of L MCA    Patient Follow-up    - final recommendation pending work-up    Thank you for this consult. We will continue to follow.     The Stroke Staff is Dr. Stafford.    Derek Solis  Medical Student  To page a member of the stroke/neurocritical care service, click here:  AMCOM   Choose \"On Call\" tab at top, then search dropdown box for \"Neurology Adult\", select location, press Enter, then look for stroke/neuro ICU/telestroke.    Attestation  I, Mala Chacko CNP, was present with the medical/CAIO student who participated in the service and in the documentation of the note.  I have verified the history and personally performed the physical exam and medical decision making.  I agree with the assessment and plan of care as documented in the note.      Mala Chacko CNP  _____________________________________________________    Clinically Significant Risk Factors Present on Admission      # Thrombocytopenia: Lowest platelets = 128 in last 2 days, will monitor for bleeding       Past Medical History    Atrial fibrillation  Thoracic aortic aneurysm    Medications   Home Meds  Prior to Admission medications    Medication Sig Start Date End Date Taking? Authorizing Provider   metoprolol succinate ER (TOPROL XL) 50 MG 24 hr tablet Take 50 mg by mouth daily    Unknown, Entered By History   sertraline (ZOLOFT) 50 MG " tablet Take 50 mg by mouth daily    Unknown, Entered By History       Scheduled Meds  Current Facility-Administered Medications   Medication Dose Route Frequency Provider Last Rate Last Admin    metoprolol succinate ER (TOPROL XL) 24 hr tablet 50 mg  50 mg Oral Daily JavieruTwyla Blake MD   50 mg at 06/03/24 1020    sertraline (ZOLOFT) tablet 50 mg  50 mg Oral or Feeding Tube Daily Javieru-Twyla Carlson MD        sodium chloride (PF) 0.9% PF flush 3 mL  3 mL Intracatheter Q8H Gabriela Schuler MD   3 mL at 06/03/24 1506       Infusion Meds  Current Facility-Administered Medications   Medication Dose Route Frequency Provider Last Rate Last Admin    Medication Instruction - Avoid dextrose in IV solutions   Intravenous Continuous PRN Gabriela Schuler MD        niCARdipine 40 mg in 200 mL NS (CARDENE) infusion  2.5-15 mg/hr Intravenous Continuous PRN Linden Yu MD        sodium chloride 0.9 % infusion   Intravenous Continuous PRN Linden Yu MD           Allergies   Allergies   Allergen Reactions    Amoxicillin Hives and Rash    Oxycodone-Acetaminophen Palpitations    Sulfamethoxazole-Trimethoprim Hives and Rash          PHYSICAL EXAMINATION   Temp:  [98.3  F (36.8  C)-98.8  F (37.1  C)] 98.4  F (36.9  C)  Pulse:  [] 98  Resp:  [6-29] 12  BP: ()/() 107/76  SpO2:  [88 %-98 %] 92 %    General Exam  General:  patient sitting upright in chair without any acute distress    HEENT:  normocephalic/atraumatic    Neuro Exam  Mental Status:  alert, oriented x 3, follows commands, slight decrease in fluency and articulation, naming and repetition normal  Cranial Nerves:  visual fields intact, PERRL, EOMI with normal smooth pursuit, facial sensation intact and symmetric, slight right nasolabial fold flattening, hearing not formally tested but intact to conversation, no dysarthria  Motor:  normal muscle tone and bulk, no abnormal movements, able to move all limbs spontaneously, strength  5/5 throughout upper and lower extremities, no pronator drift  Reflexes:  toes down-going  Sensory:  light touch sensation intact and symmetric throughout upper and lower extremities, no extinction on double simultaneous stimulation   Coordination:  normal finger-to-nose and heel-to-shin bilaterally without dysmetria  Station/Gait:  deferred    Stroke Scales    NIHSS  1a. Level of Consciousness 0-->Alert, keenly responsive   1b. LOC Questions 0-->Answers both questions correctly   1c. LOC Commands 0-->Performs both tasks correctly   2.   Best Gaze 0-->Normal   3.   Visual 0-->No visual loss   4.   Facial Palsy 1-->Minor paralysis (flattened nasolabial fold, asymmetry on smiling)    5a. Motor Arm, Left 0-->No drift, limb holds 90 (or 45) degrees for full 10 secs   5b. Motor Arm, Right 0-->No drift, limb holds 90 (or 45) degrees for full 10 secs   6a. Motor Leg, Left 0-->No drift, leg holds 30 degree position for full 5 secs   6b. Motor Leg, right 0-->No drift, leg holds 30 degree position for full 5 secs   7.   Limb Ataxia 0-->Absent   8.   Sensory 0-->Normal, no sensory loss   9.   Best Language 1-->Mild-to-moderate aphasia, some obvious loss of fluency or facility of comprehension, without significant limitation on ideas expressed or form of expression. Reduction of speech and/or comprehension, however, makes conversation. . . (see row details)   10. Dysarthria 0-->Normal   11. Extinction and Inattention  0-->No abnormality   Total 2       Imaging  I personally reviewed all imaging; relevant findings per HPI.    Labs Data   CBC  Recent Labs   Lab 06/03/24  1002 06/02/24  2324   WBC 12.9* 12.4*   RBC 5.70 5.34   HGB 17.2 16.2   HCT 49.1 45.7   * 128*     Basic Metabolic Panel   Recent Labs   Lab 06/03/24  1002 06/03/24  0306 06/02/24  2324     --  141   POTASSIUM 4.1  --  4.0   CHLORIDE 106  --  106   CO2 20*  --  22   BUN 14.3  --  14.8   CR 1.03  --  1.10   * 109* 125*   EVELYNE 9.0  --  8.9        INR    Recent Labs   Lab Test 06/02/24  2324   INR 1.15           Stroke Consult Data Data   This was a non-emergent, non-telestroke consult.  I personally examined and evaluated the patient today. At the time of my evaluation and management the patient was in critical condition today due to stroke, post-TNK. I personally managed chart review, imaging review. Key decisions made today included CT/CTA. I spent a total of 40 minutes providing critical care services, evaluating the patient, directing care and reviewing laboratory values and radiologic reports.

## 2024-06-03 NOTE — ED PROVIDER NOTES
Emergency Department Note      History of Present Illness     Chief Complaint  Stroke Symptoms    HPI  Dave Cole is a 59 year old male who presents to the ED for concern of a stroke. Per EMS, tonight around 1045 (last known well time), the patient's wife noticed that the patient was walking with a limp. She then noticed that he was using the wall to scoot himself along. He was able to make it to the bed. His wife then continued to question him and noticed that he was not speaking normally. EMS noted weakness to the right arm and leg.  EMS also stated that the patient had trouble finding words and had a hard time tracking conversation. Patient was noted to be in atrial fibrillation with rapid rate; is not anticoagulated.  Patient denies any vision changes. Blood sugar 113.  Denies chest pain, headache, shortness of breath, or abdominal pain.    Independent Historian  EMS as detailed above.    Review of External Notes  None  Past Medical History   Medical History and Problem List  Thoracic aorta dilatation  Varicose veins  Aortic insufficiency  Non-rheumatic mitral regurgitation  Thoracic aorta aneurysm without rupture  Atrial fibrillation    Medications  Metoprolol succinate  Sertraline  Tadalafil    Surgical History   Hand cyst removal  Tonsillectomy  Vasectomy  Knee cartilage surgery    Physical Exam   Patient Vitals for the past 24 hrs:   BP Pulse Resp SpO2 Weight   06/03/24 0030 -- 107 19 93 % --   06/03/24 0029 -- 107 -- 92 % --   06/03/24 0028 (!) 140/90 117 21 -- --   06/03/24 0021 (!) 122/104 114 -- (!) 88 % --   06/03/24 0017 -- 104 (!) 9 93 % --   06/03/24 0016 -- 107 -- 94 % --   06/03/24 0015 110/73 112 -- -- --   06/03/24 0014 -- 104 24 92 % --   06/03/24 0013 109/80 (!) 137 -- 90 % --   06/03/24 0011 -- 114 -- 91 % --   06/03/24 0010 (!) 144/79 110 -- -- --   06/03/24 0003 (!) 123/108 (!) 133 17 90 % --   06/03/24 0000 -- (!) 133 21 92 % --   06/02/24 2359 -- (!) 130 27 93 % --   06/02/24 2965 --  (!) 125 12 95 % --   06/02/24 2357 -- (!) 134 21 94 % --   06/02/24 2356 104/71 (!) 128 15 95 % --   06/02/24 2354 -- (!) 137 (!) 8 94 % --   06/02/24 2353 104/71 (!) 124 13 93 % --   06/02/24 2352 96/86 (!) 135 10 95 % --   06/02/24 2351 -- (!) 130 24 -- --   06/02/24 2350 -- (!) 126 19 -- --   06/02/24 2349 -- (!) 131 18 -- --   06/02/24 2348 (!) 118/100 (!) 140 -- -- --   06/02/24 2326 -- (!) 127 -- -- --   06/02/24 2324 119/67 -- 20 -- 106.6 kg (235 lb)     Physical Exam  General: Alert and cooperative with exam. Patient in moderate distress.  Mild expressive aphasia mild dysarthria  Head:  Scalp is NC/AT  Eyes:  No scleral icterus, PERRL, EOMI   ENT:  The external nose and ears are normal. The oropharynx is normal and without erythema; mucus membranes are moist. Uvula midline, no evidence of deep space infection.  Neck:  Normal range of motion without rigidity.  CV:  Tachycardic rate, regular rhythm  Resp:  Breath sounds are clear bilaterally    Non-labored, no retractions or accessory muscle use  GI:  Abdomen is soft, no distension, no tenderness. No peritoneal signs  MS:  No lower extremity edema   Skin:  Warm and dry, No rash or lesions noted.  Neuro: Oriented x 3.     GCS: 15    Mild expressive aphasia.  Mild dysarthria.  Right upper and right lower extremity drift.  Right facial droop.    Diagnostics   Lab Results   Labs Ordered and Resulted from Time of ED Arrival to Time of ED Departure   BASIC METABOLIC PANEL - Abnormal       Result Value    Sodium 141      Potassium 4.0      Chloride 106      Carbon Dioxide (CO2) 22      Anion Gap 13      Urea Nitrogen 14.8      Creatinine 1.10      GFR Estimate 77      Calcium 8.9      Glucose 125 (*)    TROPONIN T, HIGH SENSITIVITY - Abnormal    Troponin T, High Sensitivity 57 (*)    CBC WITH PLATELETS AND DIFFERENTIAL - Abnormal    WBC Count 12.4 (*)     RBC Count 5.34      Hemoglobin 16.2      Hematocrit 45.7      MCV 86      MCH 30.3      MCHC 35.4      RDW 12.3       Platelet Count 128 (*)     % Neutrophils 61      % Lymphocytes 25      % Monocytes 10      % Eosinophils 2      % Basophils 1      % Immature Granulocytes 1      NRBCs per 100 WBC 0      Absolute Neutrophils 7.7      Absolute Lymphocytes 3.1      Absolute Monocytes 1.3      Absolute Eosinophils 0.2      Absolute Basophils 0.1      Absolute Immature Granulocytes 0.1      Absolute NRBCs 0.0     INR - Normal    INR 1.15     PARTIAL THROMBOPLASTIN TIME - Normal    aPTT 32     GLUCOSE MONITOR NURSING POCT       Imaging  CTA Head Neck with Contrast   Final Result   IMPRESSION:    HEAD CT:   1.  Increased density M1 segment left middle cerebral artery suggestive of acute clot.   2.  No CT evidence of hemorrhage, midline shift or focal area of edema.      HEAD CTA:    1.  Filling defect 4 mm in length M1 segment left middle cerebral artery.   2.  Vessel cutoff at junction of the posterior division branch M2 and M3 region of the left middle cerebral artery with no contrast distally.   3.  No discrete aneurysm, high flow vascular malformation or significant stenosis involving the arteries of the [place].      NECK CTA:   1.  Normal configuration of the great vessels off the aortic arch with no significant stenosis of their origins.   2.  No significant stenosis or irregularity involving the arteries of the neck by NASCET criteria..   3.  No radiographic evidence of dissection.      Head CT following the oral communicated by phone to Dr. Christiansen at 11:40 PM. CTA results were communicated to Dr. Christiansen at 11:52 PM on 6/2/2024.      CT Head w/o Contrast   Final Result   IMPRESSION:    HEAD CT:   1.  Increased density M1 segment left middle cerebral artery suggestive of acute clot.   2.  No CT evidence of hemorrhage, midline shift or focal area of edema.      HEAD CTA:    1.  Filling defect 4 mm in length M1 segment left middle cerebral artery.   2.  Vessel cutoff at junction of the posterior division branch M2 and M3  region of the left middle cerebral artery with no contrast distally.   3.  No discrete aneurysm, high flow vascular malformation or significant stenosis involving the arteries of the [place].      NECK CTA:   1.  Normal configuration of the great vessels off the aortic arch with no significant stenosis of their origins.   2.  No significant stenosis or irregularity involving the arteries of the neck by NASCET criteria..   3.  No radiographic evidence of dissection.      Head CT following the oral communicated by phone to Dr. Christiansen at 11:40 PM. CTA results were communicated to Dr. Christiansen at 11:52 PM on 6/2/2024.        EKG   ECG taken at 0024, ECG read at 0026  Atrial fibrillation with rapid ventricular response with premature ventricular or aberrantly conducted complexes  Abnormal ECG   Rate 107 bpm. SC interval * ms. QRS duration 90 ms. QT/QTc 366/488 ms. P-R-T axes * -9 43.    Independent Interpretation  Head CT: No intracranial bleeding  ED Course    Medications Administered  Medications   sodium chloride 0.9 % infusion (has no administration in time range)   labetalol (NORMODYNE/TRANDATE) injection 10 mg (has no administration in time range)     Or   hydrALAZINE (APRESOLINE) injection 10 mg (has no administration in time range)   niCARdipine 40 mg in 200 mL NS (CARDENE) infusion (has no administration in time range)   iopamidol (ISOVUE-370) solution 67 mL (67 mLs Intravenous $Given 6/2/24 2328)     And   sodium chloride 0.9 % bag for CT scan flush use (100 mLs As instructed $Given 6/2/24 2328)   tenecteplase (TNKase) injection 25 mg (25 mg Intravenous $Given 6/2/24 2358)   metoprolol (LOPRESSOR) injection 5 mg (5 mg Intravenous $Given 6/3/24 0007)     Social Determinants of Health adding to complexity of care  None    ED Course  ED Course as of 06/03/24 0037   Sun Jun 02, 2024   2320 I obtained history and examined the patient as noted above.     Mon Jun 03, 2024   0019 I rechecked and updated the  patient.     0024 I spoke with Dr. Schuler, of the hospitalist team, regarding the patient. They accepted the patient for admission to the hospital.       Medical Decision Making / Diagnosis   CMS Diagnoses: The patient has stroke symptoms:         ED Stroke specific documentation           NIHSS PDF     Patient last known well time: 1045  ED Provider first to bedside at: 2320  CT Results received at: 2340    Thrombolytics:   Risks (including potential for bleeding and death), benefits, and alternatives to thrombolytic therapy were discussed with Patient and Family. Tenecteplase (TNK) administered without delay.    If treating with thrombolytics: Ensure SBP<180 and DBP<105 prior to treatment with thrombolytics.  Administering thrombolytics after treatment with IV labetalol, hydralazine, or nicardipine is reasonable once BP control is established.    Endovascular Retrieval:  Proximal vessel occlusion present, but endovascular treatment not initiated due to rapidly improving symptoms and low NIH stroke score    National Institutes of Health Stroke Scale (Baseline)  Time Performed: 2320     Score    Level of consciousness: (0)   Alert, keenly responsive    LOC questions: (0)   Answers both questions correctly    LOC commands: (0)   Performs both tasks correctly    Best gaze: (0)   Normal    Visual: (0)   No visual loss    Facial palsy: (2)   Partial paralysis (total/near total of lower face)    Motor arm (left): (0)   No drift    Motor arm (right): (1)   Drift    Motor leg (left): (0)   No drift    Motor leg (right): (1)   Drift    Limb ataxia: (0)   Absent    Sensory: (0)   Normal- no sensory loss    Best language: (1)   Mild to moderate aphasia    Dysarthria: (1)   Mild to moderate dysarthria    Extinction and inattention: (0)   No abnormality        Total Score:  6        Stroke Mimics were considered (including migraine headache, seizure disorder, hypoglycemia (or hyperglycemia), head or spinal trauma, CNS  infection, Toxin ingestion and shock state (e.g. sepsis) .      MIPS  None    [facility]  Dave Cole is a 59 year old male who presents for evaluation of right-sided weakness, expressive aphasia, and dysarthria.  Differential considered for symptoms included CVA, TIA, dissection, cerebral tumor, migraine, infection, metabolic derangement, demyelination, etc. Code stroke initiated immediately upon initial assessment.  Presentation is consistent with likely cerebrovascular accident; confirmed on imaging as above.  Based on time frame, stroke scale and lack of contraindications, they are a candidate for TNK.  Detailed discussion with the patient/family on the risks/benefits of TNK including increased risk of cerebrovascular hemorrhage and they express understanding.  TNK administered.  EKG shows A-fib with RVR.  Initial labs notable for minimally elevated white count (12.4) and mildly elevated troponin (57).  He was provided 5 mg IV metoprolol with significant improvement in heart rate.  Given rapidly improving symptoms and location thrombus on imaging, thrombectomy was not performed.  Admitted to ICU with hospitalist service and stroke neurology consultation.  On reassessment patient's stroke symptoms are significantly improved after receiving TNK.  Presentation consistent with acute CVA and A-fib RVR.  Hemodynamically stable at time of admission.      Critical Care time was 35 minutes for this patient excluding procedures.    Disposition  The patient was admitted to the hospital.     ICD-10 Codes:    ICD-10-CM    1. Acute CVA (cerebrovascular accident) (H)  I63.9       2. Atrial fibrillation with RVR (H)  I48.91       3. Right sided weakness  R53.1       4. Facial droop  R29.810       5. Expressive aphasia  R47.01       6. Dysarthria  R47.1              Scribe Disclosure:  Alejandro HAM, am serving as a scribe at 11:29 PM on 6/2/2024 to document services personally performed by Luis Alfredo Rodney DO based on my  observations and the provider's statements to me.        Luis Alfredo Rodney, DO  06/03/24 0337

## 2024-06-03 NOTE — CONSULTS
Patient has [employer] ([employer]) through an employer.    Xarelto/Eliquis:  $90/mo. Upon receipt of RX Discharge Pharmacy can provide copay savings card from [employer] or [employer], respectively, to reduce this to $10/mo.    Radha Arce  Pharmacy Technician/Liaison, Discharge Pharmacy   [phone] (voice or text)  [email or web address]

## 2024-06-03 NOTE — PLAN OF CARE
"Goal Outcome Evaluation:    Admitted/transferred from: [facility] ED  Reason for admission/transfer: Monitoring post TNK for ischemic stroke.  Patient status upon admission/transfer: A&O x4, with no deficits.  Patient is in good spirits.  Had two working Ivs, on no drips, on 2LPM O2.    Interventions: Monitoring neuros and vital signs.   Plan: Continue Q30M neuros until 0800, then Q1H for 16 hours, ending at 0000.   Patient belongings (see Flowsheet - Adult Profile for details): Family took belongings.         Problem: Adult Inpatient Plan of Care  Goal: Plan of Care Review  Description: The Plan of Care Review/Shift note should be completed every shift.  The Outcome Evaluation is a brief statement about your assessment that the patient is improving, declining, or no change.  This information will be displayed automatically on your shift  note.  Outcome: Progressing  Goal: Patient-Specific Goal (Individualized)  Description: You can add care plan individualizations to a care plan. Examples of Individualization might be:  \"Parent requests to be called daily at 9am for status\", \"I have a hard time hearing out of my right ear\", or \"Do not touch me to wake me up as it startles  me\".  Outcome: Progressing  Goal: Absence of Hospital-Acquired Illness or Injury  Outcome: Progressing  Intervention: Identify and Manage Fall Risk  Recent Flowsheet Documentation  Taken 6/3/2024 0330 by Kishore Lai RN  Safety Promotion/Fall Prevention: activity supervised  Intervention: Prevent Skin Injury  Recent Flowsheet Documentation  Taken 6/3/2024 0700 by Kishore Lai, RN  Body Position: position changed independently  Taken 6/3/2024 0600 by Kishore Lai RN  Body Position:   position changed independently   left   turned  Taken 6/3/2024 0330 by Kishore Lai RN  Body Position: supine  Intervention: Prevent Infection  Recent Flowsheet Documentation  Taken 6/3/2024 0330 by Kishore Lai RN  Infection " Prevention:   cohorting utilized   environmental surveillance performed   hand hygiene promoted   personal protective equipment utilized   rest/sleep promoted   single patient room provided   visitors restricted/screened   equipment surfaces disinfected  Goal: Optimal Comfort and Wellbeing  Outcome: Progressing  Goal: Readiness for Transition of Care  Outcome: Progressing  Intervention: Mutually Develop Transition Plan  Recent Flowsheet Documentation  Taken 6/3/2024 0600 by Kishore Lai RN  Equipment Currently Used at Home: none

## 2024-06-03 NOTE — CONSULTS
"      [facility]     Stroke Code Note          History of Present Illness     Chief Complaint: No chief complaint on file.      Dave Cole is a 59 year old man with h/o thoracic aortic aneurysm, A Fib not on anticaogulation. LKW was 10:30 PM prior to using the restroom; wife then saw him around 10:45 PM with R leg dragging, r facial droop, speech difficulty and slurring. ED exam concerning for mod aphasia, drift in R UE and LE, R facial droop and stroke code was activated. Initial tele exam was concerning for drift in R UE, R facial droop and dysarthria which subsequently resolved prior to tnk administration with main issue remaining being mild-mod aphasia         Past Medical History     Stroke risk factors: A Fib    Preadmission antithrombotic regimen: none    Modified [place] Score (Pre-morbid)  0                   Assessment and Plan       1.  Left M1 occlusion and more distally posterior division branch M2 occlusion     Intravenous Thrombolysis  Risks (including potential for bleeding and death), benefits, and alternatives to thrombolytic therapy were discussed with Patient and Family. Tenecteplase (TNK) administered without delay.     Endovascular Treatment  Proximal vessel occlusion present, but endovascular treatment not initiated due to low NIHSS     Plan:  - Use orderset: \"Ischemic Stroke Post-Thrombolytics/Thrombectomy ICU Admission\"  - Neurochecks and vital signs per post-thrombolytic orders and monitor closely for any evidence of CNS hemorrhage, bleeding, or orolingual angioedema  - Goal BP < 180 / 105  - If change in neuro exam, please activate Tier 2 Stroke code  - Hold all antithrombotic and anticoagulant medications for 24 hrs post-thrombolytic  - Hold pharmacologic VTE prophylaxis for 24 hrs post-thrombolytic  - Statin:  per lipid profile results (LDL goal < 70)  - Repeat Head CT 24 hrs post-thrombolytic  - MRI Brain with and without contrast   - TTE (with Bubble Study if " "age 60 yrs or less)  - Telemetry, EKG  - Bedside Glucose Monitoring  - A1c, Lipid Panel, Troponin x 3  - PT/OT/SLP  - Stroke Education  - Euthermia, Euglycemia     ___________________________________________________________________    The Stroke Staff is Dr. Mcelroy.    Linden Yu MD  Vascular Neurology Fellow    To page me or covering stroke neurology team member, click here: [email or web address]  Choose \"On Call\" tab at top, then select \"NEUROLOGY/ALL SITES\" from middle drop-down box, press Enter, then look for \"stroke\" or \"telestroke\" for your site.  ___________________________________________________________________        Imaging/Labs   (personally reviewed )    CT head: No acute ischemia/hemorrhage  CTA head/neck:   1.  Filling defect 4 mm in length M1 segment left middle cerebral artery.  2.  Vessel cutoff at junction of the posterior division branch M2 and M3 region of the left middle cerebral artery with no contrast distally.           Physical Examination     BP: 119/67   Pulse: (!) 127   Resp: 20                   Weight: 106.6 kg (235 lb)    Wt Readings from Last 2 Encounters:   06/02/24 106.6 kg (235 lb)       See NIHSS        Stroke Scales       NIHSS  1a. Level of Consciousness 0-->Alert, keenly responsive   1b. LOC Questions 0-->Answers both questions correctly   1c. LOC Commands 0-->Performs both tasks correctly   2.   Best Gaze 0-->Normal   3.   Visual 0-->No visual loss   4.   Facial Palsy 0-->Normal symmetrical movements   5a. Motor Arm, Left 0-->No drift, limb holds 90 (or 45) degrees for full 10 secs   5b. Motor Arm, Right 0-->No drift, limb holds 90 (or 45) degrees for full 10 secs   6a. Motor Leg, Left 0-->No drift, leg holds 30 degree position for full 5 secs   6b. Motor Leg, right 0-->No drift, leg holds 30 degree position for full 5 secs   7.   Limb Ataxia 0-->Absent   8.   Sensory 0-->Normal, no sensory loss   9.   Best Language 1-->Mild-to-moderate aphasia, some obvious loss of fluency or " "facility of comprehension, without significant limitation on ideas expressed or form of expression. Reduction of speech and/or comprehension, however, makes conversation. . . (see row details)   10. Dysarthria 0-->Normal   11. Extinction and Inattention  0-->No abnormality   Total 1 (06/02/24 0148)            Labs     CBC  No results found for: \"HGB\", \"HCT\", \"WBC\", \"PLT\"    BMP  No results found for: \"NA\", \"POTASSIUM\", \"CHLORIDE\", \"CO2\", \"BUN\", \"CR\", \"GLC\", \"EVELYNE\"    INR  No results found for: \"INR\"    Data   Stroke Code Data  (for stroke code with tele)  Stroke code activated 06/02/24 2324   First stroke provider response 06/02/24  2326   Video start time 06/02/24  2334   Video end time 06/03/24  0015   Last known normal 06/02/24  2230   Time of discovery (or onset of symptoms)  06/02/24  2245   Head CT read by Stroke Neuro Provider 06/03/24  2332   Was stroke code de-escalated? No           Telestroke Service Details  Type of service telemedicine diagnostic assessment of acute neurological changes   Reason telemedicine is appropriate patient requires assessment with a specialist for diagnosis and treatment of neurological symptoms   Mode of transmission secure interactive audio and video communication per [place]   Originating site (patient location) [facility]    Distant site (provider location) Provider remote site        Clinically Significant Risk Factors Present on Admission                                    Time Spent on this Encounter     "

## 2024-06-03 NOTE — H&P
[facility]    Hospitalist History and Physical  Date of Admission:  6/2/2024    Primary Care Physician   No primary care provider on file.    Chief Complaint  Stroke Symptoms    History obtained from the patient and his wife    History of Present Illness   Dave Cole is a 59 year old male with a past medical history of paroxysmal atrial fibrillation presents to hospital for right-sided weakness.  The patient noticed that he was back in atrial fibrillation earlier on the day of presentation due to dyspnea and palpitations.  He has a history of paroxysmal atrial fibrillation follows with cardiology.  His [id number] score is 0.  He takes metoprolol as needed.  In the evening as he was getting ready for bed he noticed that he is having difficulty opening the bathroom door and had to use his left arm.  While walking to his side of the bedroom his wife noticed that he was stumbling and bumping into things.  His speech was garbled.  At that point his wife called EMS and they brought him to the hospital.  Patient was noted to be in A-fib RVR.  Workup in the emergency room included neuroimaging.  CTA of the head revealed a filling defect in the M1 segment of the left middle cerebral artery with increased density in the same area seen on CT concerning for an acute clot.  TNKase was administered in the emergency room with improvement of symptoms.  The patient is being admitted to the ICU for monitoring.  During my interview all the patient's symptoms have resolved.  He provides no further complaints.    Past Medical History    I have reviewed this patient's medical history and updated it with pertinent information if needed.   Atrial fibrillation  Aortic root dilation    Past Surgical History   I have reviewed this patient's surgical history and updated it with pertinent information if needed.  No past surgical history on file.    Allergies   Allergies   Allergen Reactions    Amoxicillin Hives and  Rash    Oxycodone-Acetaminophen Palpitations    Sulfamethoxazole-Trimethoprim Hives and Rash       Social History   I have reviewed this patient's social history and updated it with pertinent information if needed. Dave Cole  denies any tobacco use.  Occasional alcohol use.    Family History   I have reviewed this patient's family history and updated it with pertinent information if needed.   No family history on file.  His father had a history of coronary artery disease, atrial fibrillation    Physical Exam       BP: (!) 144/79 Pulse: 114   Resp: 17 SpO2: 91 %      Vital Signs with Ranges  Pulse:  [110-140] 114  Resp:  [8-27] 17  BP: ()/() 144/79  SpO2:  [90 %-95 %] 91 %  235 lbs 0 oz  Physical Exam  Vitals reviewed.   Constitutional:       Appearance: Normal appearance.      Comments: Very pleasant well-developed well-nourished man seen resting bed comfortably no apparent distress in the emergency room.  He is accompanied by his wife and son are bedside.   HENT:      Head: Normocephalic and atraumatic.      Mouth/Throat:      Mouth: Mucous membranes are moist.      Pharynx: Oropharynx is clear.   Eyes:      Extraocular Movements: Extraocular movements intact.      Conjunctiva/sclera: Conjunctivae normal.      Pupils: Pupils are equal, round, and reactive to light.   Cardiovascular:      Rate and Rhythm: Tachycardia present. Rhythm irregular.      Heart sounds: Normal heart sounds. No murmur heard.  Pulmonary:      Effort: Pulmonary effort is normal.      Breath sounds: Normal breath sounds. No wheezing, rhonchi or rales.   Abdominal:      General: Abdomen is flat. Bowel sounds are normal.      Palpations: Abdomen is soft.   Musculoskeletal:         General: No swelling.   Skin:     General: Skin is warm and dry.   Neurological:      General: No focal deficit present.      Mental Status: He is alert and oriented to person, place, and time. Mental status is at baseline.      Cranial Nerves: No cranial  nerve deficit.      Motor: No weakness.      Coordination: Coordination normal.         Assessment & Plan   Dave Cole is a 59 year old male with a past medical history of paroxysmal atrial fibrillation presents to hospital with a stroke.    Acute ischemic stroke  Patient presents to hospital with acute right-sided weakness involving his left face, upper and lower extremities.  His symptoms started sometime after 10:45 PM on June 2.  CT and CTA concerning for an acute clot in the M1 distribution of the left middle cerebral artery.  He received TNKase at 11:55 PM. Symptoms resolved while the patient was in the ed.  Admitted to ICU  Follow-up brain MRI, echocardiogram, monitor on telemetry  Follow-up A1c and lipid profile  Follow-up neurology consult, speech, PT, OT  Blood pressure goal less than 180/105, PRNs available  Neurochecks per post thrombolytic protocol    Paroxysmal atrial fibrillation.  A-fib RVR  IV metoprolol as needed for tachycardia  Resume PTA metoprolol once med rec is complete.  Given stroke you will need to be on anticoagulation going forward    DVT ppx: SCDs  Expected length of stay greater than than 2 days  Code Status: Full code    Medical Decision Making       75 MINUTES SPENT BY ME on the date of service doing chart review, history, exam, documentation & further activities per the note.

## 2024-06-03 NOTE — PLAN OF CARE
SLP Deferral: Order received. Chart reviewed. Patient has regular diet order in [facility]. RN reported no swallowing concerns with swallow screen. Spoke with patient and with spouse present. Patient reported no concerns with drinking full glass of water this AM and is awaiting breakfast. Patient able to participate in conversation this AM without apparent word-finding difficulty. Speech fully intelligible. SLP swallow evaluation does not appear indicated at this time. SLP to complete order in Epic. Please re-consult SLP if swallowing or communication concerns should arise.

## 2024-06-04 LAB
ANION GAP SERPL CALCULATED.3IONS-SCNC: 12 MMOL/L (ref 7–15)
APTT PPP: 31 SECONDS (ref 22–38)
BUN SERPL-MCNC: 16 MG/DL (ref 8–23)
CALCIUM SERPL-MCNC: 9 MG/DL (ref 8.6–10)
CHLORIDE SERPL-SCNC: 105 MMOL/L (ref 98–107)
CREAT SERPL-MCNC: 1.07 MG/DL (ref 0.67–1.17)
DEPRECATED HCO3 PLAS-SCNC: 21 MMOL/L (ref 22–29)
EGFRCR SERPLBLD CKD-EPI 2021: 80 ML/MIN/1.73M2
ERYTHROCYTE [DISTWIDTH] IN BLOOD BY AUTOMATED COUNT: 12.5 % (ref 10–15)
GLUCOSE SERPL-MCNC: 107 MG/DL (ref 70–99)
HCT VFR BLD AUTO: 47.2 % (ref 40–53)
HGB BLD-MCNC: 16.4 G/DL (ref 13.3–17.7)
INR PPP: 1.14 (ref 0.85–1.15)
MCH RBC QN AUTO: 30.1 PG (ref 26.5–33)
MCHC RBC AUTO-ENTMCNC: 34.7 G/DL (ref 31.5–36.5)
MCV RBC AUTO: 87 FL (ref 78–100)
PLATELET # BLD AUTO: 112 10E3/UL (ref 150–450)
POTASSIUM SERPL-SCNC: 4.2 MMOL/L (ref 3.4–5.3)
PROCALCITONIN SERPL IA-MCNC: 0.05 NG/ML
RBC # BLD AUTO: 5.45 10E6/UL (ref 4.4–5.9)
SODIUM SERPL-SCNC: 138 MMOL/L (ref 135–145)
WBC # BLD AUTO: 12.8 10E3/UL (ref 4–11)

## 2024-06-04 PROCEDURE — 85610 PROTHROMBIN TIME: CPT | Performed by: HOSPITALIST

## 2024-06-04 PROCEDURE — 84145 PROCALCITONIN (PCT): CPT | Performed by: HOSPITALIST

## 2024-06-04 PROCEDURE — 80048 BASIC METABOLIC PNL TOTAL CA: CPT | Performed by: HOSPITALIST

## 2024-06-04 PROCEDURE — 99291 CRITICAL CARE FIRST HOUR: CPT | Mod: FS | Performed by: NURSE PRACTITIONER

## 2024-06-04 PROCEDURE — 120N000001 HC R&B MED SURG/OB

## 2024-06-04 PROCEDURE — 250N000013 HC RX MED GY IP 250 OP 250 PS 637: Performed by: NURSE PRACTITIONER

## 2024-06-04 PROCEDURE — 36415 COLL VENOUS BLD VENIPUNCTURE: CPT | Performed by: HOSPITALIST

## 2024-06-04 PROCEDURE — 99233 SBSQ HOSP IP/OBS HIGH 50: CPT | Performed by: HOSPITALIST

## 2024-06-04 PROCEDURE — 85730 THROMBOPLASTIN TIME PARTIAL: CPT | Performed by: HOSPITALIST

## 2024-06-04 PROCEDURE — 250N000013 HC RX MED GY IP 250 OP 250 PS 637: Performed by: HOSPITALIST

## 2024-06-04 PROCEDURE — 85027 COMPLETE CBC AUTOMATED: CPT | Performed by: HOSPITALIST

## 2024-06-04 RX ORDER — ATORVASTATIN CALCIUM 40 MG/1
40 TABLET, FILM COATED ORAL EVERY EVENING
Status: DISCONTINUED | OUTPATIENT
Start: 2024-06-04 | End: 2024-06-05 | Stop reason: HOSPADM

## 2024-06-04 RX ORDER — METOPROLOL SUCCINATE 25 MG/1
25 TABLET, EXTENDED RELEASE ORAL ONCE
Status: COMPLETED | OUTPATIENT
Start: 2024-06-04 | End: 2024-06-04

## 2024-06-04 RX ADMIN — METOPROLOL SUCCINATE 50 MG: 50 TABLET, EXTENDED RELEASE ORAL at 08:16

## 2024-06-04 RX ADMIN — METOPROLOL SUCCINATE 25 MG: 25 TABLET, EXTENDED RELEASE ORAL at 12:32

## 2024-06-04 RX ADMIN — SERTRALINE HYDROCHLORIDE 50 MG: 50 TABLET ORAL at 21:04

## 2024-06-04 RX ADMIN — ATORVASTATIN CALCIUM 40 MG: 40 TABLET, FILM COATED ORAL at 21:04

## 2024-06-04 ASSESSMENT — ACTIVITIES OF DAILY LIVING (ADL)
ADLS_ACUITY_SCORE: 23
ADLS_ACUITY_SCORE: 23
ADLS_ACUITY_SCORE: 24
ADLS_ACUITY_SCORE: 23
ADLS_ACUITY_SCORE: 24
ADLS_ACUITY_SCORE: 23
ADLS_ACUITY_SCORE: 24
ADLS_ACUITY_SCORE: 23
ADLS_ACUITY_SCORE: 23
DEPENDENT_IADLS:: INDEPENDENT
ADLS_ACUITY_SCORE: 23
ADLS_ACUITY_SCORE: 24
ADLS_ACUITY_SCORE: 23
ADLS_ACUITY_SCORE: 24
ADLS_ACUITY_SCORE: 23

## 2024-06-04 NOTE — PLAN OF CARE
"  Problem: Adult Inpatient Plan of Care  Goal: Plan of Care Review  Description: The Plan of Care Review/Shift note should be completed every shift.  The Outcome Evaluation is a brief statement about your assessment that the patient is improving, declining, or no change.  This information will be displayed automatically on your shift  note.  Outcome: Progressing  Flowsheets (Taken 6/4/2024 0646)  Outcome Evaluation: Neuros intact. Afib controlled. BP WDL. On room air. Reg diet tolerated. Up with SBA to BR. CT done.  Overall Patient Progress: improving  Goal: Patient-Specific Goal (Individualized)  Description: You can add care plan individualizations to a care plan. Examples of Individualization might be:  \"Parent requests to be called daily at 9am for status\", \"I have a hard time hearing out of my right ear\", or \"Do not touch me to wake me up as it startles  me\".  Outcome: Progressing  Goal: Absence of Hospital-Acquired Illness or Injury  Outcome: Progressing  Intervention: Identify and Manage Fall Risk  Recent Flowsheet Documentation  Taken 6/4/2024 0400 by Kishore Gonzalez RN  Safety Promotion/Fall Prevention:   activity supervised   treat reversible contributory factors   treat underlying cause   safety round/check completed  Taken 6/4/2024 0000 by Kishore Gonzalez RN  Safety Promotion/Fall Prevention:   activity supervised   treat reversible contributory factors   treat underlying cause   safety round/check completed  Intervention: Prevent Skin Injury  Recent Flowsheet Documentation  Taken 6/4/2024 0400 by Kishore Gonzalez RN  Body Position: position changed independently  Taken 6/4/2024 0000 by Kishore Gonzalez RN  Body Position: position changed independently  Intervention: Prevent and Manage VTE (Venous Thromboembolism) Risk  Recent Flowsheet Documentation  Taken 6/4/2024 0400 by Kishore Gonzalez RN  VTE Prevention/Management: SCDs (sequential compression devices) on  Taken 6/4/2024 0000 by " [place], Kishore, RN  VTE Prevention/Management: SCDs (sequential compression devices) on  Intervention: Prevent Infection  Recent Flowsheet Documentation  Taken 6/4/2024 0400 by Kishore Gonzalez RN  Infection Prevention:   environmental surveillance performed   equipment surfaces disinfected   hand hygiene promoted   personal protective equipment utilized   rest/sleep promoted   single patient room provided  Taken 6/4/2024 0000 by Kishore Gonzalez RN  Infection Prevention:   environmental surveillance performed   equipment surfaces disinfected   hand hygiene promoted   personal protective equipment utilized   rest/sleep promoted   single patient room provided  Goal: Optimal Comfort and Wellbeing  Outcome: Progressing  Intervention: Provide Person-Centered Care  Recent Flowsheet Documentation  Taken 6/4/2024 0400 by Kishore Gonzalez RN  Trust Relationship/Rapport:   care explained   choices provided   emotional support provided   empathic listening provided   questions answered   questions encouraged   reassurance provided   thoughts/feelings acknowledged  Taken 6/4/2024 0000 by Kishore Gonzalez RN  Trust Relationship/Rapport:   care explained   choices provided   emotional support provided   empathic listening provided   questions answered   questions encouraged   reassurance provided   thoughts/feelings acknowledged  Goal: Readiness for Transition of Care  Outcome: Progressing   Goal Outcome Evaluation:           Overall Patient Progress: improvingOverall Patient Progress: improving    Outcome Evaluation: Neuros intact. Afib controlled. BP WDL. On room air. Reg diet tolerated. Up with SBA to BR. CT done.

## 2024-06-04 NOTE — PLAN OF CARE
"Goal Outcome Evaluation:    Pt. In room air, clear lung sounds, neuro exam intact, pt. Oriented, pleasant and cooperative, CV: remains in a. Fib, BP acceptable, HR noted to be in 150s-160s when pt. Ambulated to commode, BP stable and pt. Alert at this time and felt normal (not dizzy) MD aware and metoprolol increased, plan to monitor HR for now ambulating to commode for HR improvement/lowering - no ambulating long distances or in hallway for now.  If no improvement hospital MD may consult cardiology, pt. With large voids, taking good PO intake, pt's wife at bedside and was updated and supportive, pt. Transported in wheelchair on monitor by flying squad to 7th floor without incident with all belongings.      Problem: Adult Inpatient Plan of Care  Goal: Plan of Care Review  Description: The Plan of Care Review/Shift note should be completed every shift.  The Outcome Evaluation is a brief statement about your assessment that the patient is improving, declining, or no change.  This information will be displayed automatically on your shift  note.  6/4/2024 1530 by Last Scott RN  Outcome: Progressing  6/4/2024 1529 by Last Scott RN  Outcome: Progressing  6/4/2024 1528 by Last Scott RN  Outcome: Progressing  Goal: Patient-Specific Goal (Individualized)  Description: You can add care plan individualizations to a care plan. Examples of Individualization might be:  \"Parent requests to be called daily at 9am for status\", \"I have a hard time hearing out of my right ear\", or \"Do not touch me to wake me up as it startles  me\".  6/4/2024 1530 by Last Scott RN  Outcome: Progressing  6/4/2024 1529 by Last Scott RN  Outcome: Progressing  6/4/2024 1528 by Last Scott RN  Outcome: Progressing  Goal: Absence of Hospital-Acquired Illness or Injury  6/4/2024 1530 by Last Scott, RN  Outcome: Progressing  6/4/2024 1529 by Last Scott RN  Outcome: Progressing  6/4/2024 1528 " by Last Scott RN  Outcome: Progressing  Intervention: Identify and Manage Fall Risk  Recent Flowsheet Documentation  Taken 6/4/2024 1200 by Last Scott RN  Safety Promotion/Fall Prevention:   activity supervised   treat reversible contributory factors   treat underlying cause   safety round/check completed  Taken 6/4/2024 0800 by Last Scott RN  Safety Promotion/Fall Prevention:   activity supervised   treat reversible contributory factors   treat underlying cause   safety round/check completed  Intervention: Prevent Skin Injury  Recent Flowsheet Documentation  Taken 6/4/2024 1400 by Last Scott RN  Body Position: position changed independently  Taken 6/4/2024 1200 by Last Scott RN  Body Position: position changed independently  Taken 6/4/2024 1000 by Last Scott RN  Body Position: position changed independently  Taken 6/4/2024 0800 by Last Scott RN  Body Position: position changed independently  Intervention: Prevent and Manage VTE (Venous Thromboembolism) Risk  Recent Flowsheet Documentation  Taken 6/4/2024 1200 by Lats Scott RN  VTE Prevention/Management: SCDs (sequential compression devices) on  Taken 6/4/2024 0800 by Last Scott RN  VTE Prevention/Management: SCDs (sequential compression devices) on  Intervention: Prevent Infection  Recent Flowsheet Documentation  Taken 6/4/2024 1200 by Last Scott RN  Infection Prevention:   environmental surveillance performed   equipment surfaces disinfected   hand hygiene promoted   personal protective equipment utilized   rest/sleep promoted   single patient room provided  Taken 6/4/2024 0800 by Last Scott RN  Infection Prevention:   environmental surveillance performed   equipment surfaces disinfected   hand hygiene promoted   personal protective equipment utilized   rest/sleep promoted   single patient room provided  Goal: Optimal Comfort and Wellbeing  6/4/2024 1530 by Tyler  "Last MURPHY RN  Outcome: Progressing  6/4/2024 1529 by Last Scott RN  Outcome: Progressing  6/4/2024 1528 by Last Scott RN  Outcome: Progressing  Intervention: Provide Person-Centered Care  Recent Flowsheet Documentation  Taken 6/4/2024 1200 by Last Scott RN  Trust Relationship/Rapport:   care explained   choices provided   emotional support provided   empathic listening provided   questions answered   questions encouraged   reassurance provided   thoughts/feelings acknowledged  Taken 6/4/2024 0800 by Last Scott RN  Trust Relationship/Rapport:   care explained   choices provided   emotional support provided   empathic listening provided   questions answered   questions encouraged   reassurance provided   thoughts/feelings acknowledged  Goal: Readiness for Transition of Care  6/4/2024 1530 by Last Scott RN  Outcome: Progressing  6/4/2024 1529 by Last Scott RN  Outcome: Progressing  6/4/2024 1528 by Last Scott RN  Outcome: Progressing       Problem: Adult Inpatient Plan of Care  Goal: Plan of Care Review  Description: The Plan of Care Review/Shift note should be completed every shift.  The Outcome Evaluation is a brief statement about your assessment that the patient is improving, declining, or no change.  This information will be displayed automatically on your shift  note.  6/4/2024 1530 by Last Scott RN  Outcome: Progressing  6/4/2024 1529 by Last Scott RN  Outcome: Progressing  6/4/2024 1528 by Last Scott RN  Outcome: Progressing  Goal: Patient-Specific Goal (Individualized)  Description: You can add care plan individualizations to a care plan. Examples of Individualization might be:  \"Parent requests to be called daily at 9am for status\", \"I have a hard time hearing out of my right ear\", or \"Do not touch me to wake me up as it startles  me\".  6/4/2024 1530 by Last Scott RN  Outcome: Progressing  6/4/2024 1529 by " Last Scott RN  Outcome: Progressing  6/4/2024 1528 by Last Scott RN  Outcome: Progressing  Goal: Absence of Hospital-Acquired Illness or Injury  6/4/2024 1530 by Last Scott RN  Outcome: Progressing  6/4/2024 1529 by Last Scott RN  Outcome: Progressing  6/4/2024 1528 by Last Scott RN  Outcome: Progressing  Intervention: Identify and Manage Fall Risk  Recent Flowsheet Documentation  Taken 6/4/2024 1200 by Last Scott RN  Safety Promotion/Fall Prevention:   activity supervised   treat reversible contributory factors   treat underlying cause   safety round/check completed  Taken 6/4/2024 0800 by Last Scott RN  Safety Promotion/Fall Prevention:   activity supervised   treat reversible contributory factors   treat underlying cause   safety round/check completed  Intervention: Prevent Skin Injury  Recent Flowsheet Documentation  Taken 6/4/2024 1400 by Last Scott RN  Body Position: position changed independently  Taken 6/4/2024 1200 by Last Scott RN  Body Position: position changed independently  Taken 6/4/2024 1000 by Last Scott RN  Body Position: position changed independently  Taken 6/4/2024 0800 by Last Scott RN  Body Position: position changed independently  Intervention: Prevent and Manage VTE (Venous Thromboembolism) Risk  Recent Flowsheet Documentation  Taken 6/4/2024 1200 by Last Scott RN  VTE Prevention/Management: SCDs (sequential compression devices) on  Taken 6/4/2024 0800 by Last Scott RN  VTE Prevention/Management: SCDs (sequential compression devices) on  Intervention: Prevent Infection  Recent Flowsheet Documentation  Taken 6/4/2024 1200 by Last Scott RN  Infection Prevention:   environmental surveillance performed   equipment surfaces disinfected   hand hygiene promoted   personal protective equipment utilized   rest/sleep promoted   single patient room provided  Taken 6/4/2024 0800  by Last Scott RN  Infection Prevention:   environmental surveillance performed   equipment surfaces disinfected   hand hygiene promoted   personal protective equipment utilized   rest/sleep promoted   single patient room provided  Goal: Optimal Comfort and Wellbeing  6/4/2024 1530 by Last Scott RN  Outcome: Progressing  6/4/2024 1529 by Last Scott RN  Outcome: Progressing  6/4/2024 1528 by Last Scott RN  Outcome: Progressing  Intervention: Provide Person-Centered Care  Recent Flowsheet Documentation  Taken 6/4/2024 1200 by Last Scott RN  Trust Relationship/Rapport:   care explained   choices provided   emotional support provided   empathic listening provided   questions answered   questions encouraged   reassurance provided   thoughts/feelings acknowledged  Taken 6/4/2024 0800 by Last Scott RN  Trust Relationship/Rapport:   care explained   choices provided   emotional support provided   empathic listening provided   questions answered   questions encouraged   reassurance provided   thoughts/feelings acknowledged  Goal: Readiness for Transition of Care  6/4/2024 1530 by Last Scott RN  Outcome: Progressing  6/4/2024 1529 by Last Scott RN  Outcome: Progressing  6/4/2024 1528 by Last Scott RN  Outcome: Progressing

## 2024-06-04 NOTE — CONSULTS
Care Management Initial Consult    General Information  Assessment completed with: Patient,    Type of CM/SW Visit: Initial Assessment    Primary Care Provider verified and updated as needed: Yes   Readmission within the last 30 days:        Reason for Consult: discharge planning  Advance Care Planning:            Communication Assessment  Patient's communication style: spoken language (English or Bilingual)    Hearing Difficulty or Deaf: no   Wear Glasses or Blind: yes    Cognitive  Cognitive/Neuro/Behavioral: WDL  Level of Consciousness: alert  Arousal Level: opens eyes spontaneously  Orientation: oriented x 4  Mood/Behavior: cooperative, calm  Best Language: 0 - No aphasia  Speech: logical, clear    Living Environment:   People in home: spouse     Current living Arrangements: house      Able to return to prior arrangements: yes       Family/Social Support:  Care provided by: self  Provides care for: no one  Marital Status:              Description of Support System:           Current Resources:   Patient receiving home care services: No     Community Resources:    Equipment currently used at home: none  Supplies currently used at home:      Employment/Financial:  Employment Status: employed full-time        Financial Concerns:             Does the patient's insurance plan have a 3 day qualifying hospital stay waiver?  No    Lifestyle & Psychosocial Needs:  Social Determinants of Health     Food Insecurity: Not At Risk (5/23/2024)    Received from [employer]    Food Insecurity     Does your food run out before you have the money to buy more?: No   Depression: Not at risk (1/8/2024)    Received from [employer]    PHQ-2     PHQ-2 Score: 0   Housing Stability: Not on file   Tobacco Use: Low Risk  (5/24/2024)    Received from [employer]    Patient History     Smoking Tobacco Use: Never     Smokeless Tobacco Use: Never     Passive Exposure: Not on file   Financial Resource Strain: Not At Risk  (5/23/2024)    Received from [facility]    Financial Resource Strain     Is it hard for you to pay for the very basics like food, housing, medical care or heating?: No   Alcohol Use: Not on file   Transportation Needs: Not At Risk (5/23/2024)    Received from [employer]    Transportation Needs     Does a lack of transportation keep you from your medical appointments or from getting your medications?: No   Physical Activity: Not on file   Interpersonal Safety: Not on file   Stress: Not on file   Social Connections: Not on file   Health Literacy: Not on file       Functional Status:  Prior to admission patient needed assistance:   Dependent ADLs:: Independent  Dependent IADLs:: Independent       Mental Health Status:          Chemical Dependency Status:                Values/Beliefs:  Spiritual, Cultural Beliefs, Taoism Practices, Values that affect care:                 Additional Information:  Met with patient to discuss role in discharge planning.  Pt lives indep in home with wife. No services in the home. Pt drives and works full time.   PCP is Dr Liang  Pt has cardiologist in [facility].  PT has recommend home with assist.     Pt does not feel he will need anything in the home.   Pt wants to make own Follow-up.     CC will follow if needs arise.      Chyna Turcios, RN

## 2024-06-04 NOTE — PROGRESS NOTES
[facility]    Medicine Progress Note - Hospitalist Service    Date of Admission:  6/2/2024    Assessment & Plan   Dave Cole is a 59 year old male with a past medical history of paroxysmal atrial fibrillation presents to hospital with a stroke.    Acute ischemic stroke  Patient presents to hospital with acute right-sided weakness involving his left face, upper and lower extremities.  His symptoms started sometime after 10:45 PM on June 2.  CT and CTA concerning for an acute clot in the M1 distribution of the left middle cerebral artery.  He received TNKase at 11:55 PM. Symptoms resolved while the patient was in the ed.  Plan  -- Brain MRI - Acute infarcts within the left basal ganglia and insults  -- Echocardiogram - EF 50 - 55%, aortic root aneurysm.  -- continue monitor on telemetry  -- Neurology consult - input appreciated.  Etiology is cardioembolic.  Now started on Eliquis 5 mg twice daily.  -- speech, PT, OT - pendingg  -- Blood pressure goal less than 180/105, PRNs available  -- Neurochecks per post thrombolytic protocol    Paroxysmal atrial fibrillation.  A-fib RVR  -- cont PTA Toprol ,will increase dose from 50 mg to 75 mg due to uncontrolled heart rates especially with ambulation.  --Case discussed with cardiology, if this does not work we will increase further to 50 mg twice daily of Toprol.  --Patient to follow-up with his outpatient cardiologist on discharge.  -- Now started on Eliquis 5 mg twice daily per Neurology.  -- cont telemonitoring          Diet: Regular Diet Adult    DVT Prophylaxis: Pneumatic Compression Devices  White Catheter: Not present  Lines: None     Cardiac Monitoring: ACTIVE order. Indication: ICU  Code Status: Full Code      Clinically Significant Risk Factors                # Thrombocytopenia: Lowest platelets = 112 in last 2 days, will monitor for bleeding                           Disposition Plan     Medically Ready for Discharge: Anticipated in 2-4  Days             Twyla Villa MD  Hospitalist Service  [facility]  Securely message with MoboTap (more info)  Text page via Launch[employer] Paging/Directory   ______________________________________________________________________    Interval History   Patient doing well, no complaints of chest pain  No complaint of right side weakness or speech impairment.   Notified of patient increased heart rate with any kind of activity up to the 160s.  Will increase beta-blocker.    Physical Exam   Vital Signs: Temp: 98.4  F (36.9  C) Temp src: Oral BP: 110/73 Pulse: 96   Resp: (!) 3 SpO2: 93 % O2 Device: None (Room air)    Weight: 236 lbs 15.91 oz    General Appearance: Well appearing for stated age.  Respiratory: CTAB, no rales or ronchi  Cardiovascular: S1, S2 normal, no murmurs  GI: non-tender on palpation, BS present  Neuro: 5/5 strength in all extremities.    Medical Decision Making             Data

## 2024-06-04 NOTE — PROGRESS NOTES
[facility]    Stroke Progress Note    Interval Events  Symptoms are stable. Patient has no new complaints. MRI was significant for acute infarcts within the left basal ganglia and insula. Repeat head CT with evolving left basal ganglia infarct with minimal mass effect on the left lateral ventricle. Repeat head/neck CTA demonstrates resolution of previously seen filling defects.    HPI Summary  Dave Cole is a 59 year old male with past medical history significant for atrial fibrillation (not on anticoagulation) and thoracic aortic aneurysm who presents to the hospital for acute right-sided weakness at 2245 last night. Patient's wife reports weakness of his right face, upper and lower extremities at that time, as he had difficulty opening the bathroom door and getting into bed. Difficulty speaking and slurred speech are also noted. LKW at 2230 last night, while entering the bathroom. Symptoms reportedly improved in ED with TNKase per wife. Patient states that he feels well at this time.     Patient reports multiple episodes of palpitations and shortness of breath associated with his atrial fibrillation, last occurring about 2 years ago. He endorses a previous Holter monitor with the first of these episodes.     Stroke Evaluation Summarized    MRI/Head CT MRI significant for acute infarcts within the left basal ganglia and insula.   Intracranial Vasculature CTA head significant for:  Filling defect 4 mm in length M1 segment left middle cerebral artery  Vessel cutoff at junction of the posterior division branch M2 and M3 region of the left middle cerebral artery with no contrast distally   Cervical Vasculature CTA neck without significant stenosis or dissection      Echocardiogram Ejection fraction 55-60%. Aortic root aneurysm present. Rhythm was atrial fibrillation. Mild concentric left ventricular hypertrophy.   EKG/Telemetry Afib with RVR with PVC or ACC   Other Testing Not  "Applicable     LDL  6/2/2024: 106 mg/dL   A1C  6/2/2024: 5.1 %   Troponin 6/3/2024: 49 ng/L       Impression   Acute ischemic stroke of left middle cerebral artery due to cardioembolism in the setting of atrial fibrillation, not on anticoagulation    Plan  - Started on Eliquis 5 mg BID today  - Ok to transfer to floor with q4 per hour neuro checks.  - Statin:   (goal <70), started on Lipitor 40 mg daily  - Maintain BP long term goal <130/80  - Stroke Education  - Therapies, as needed  - Interested in DISCOVERY trial    Patient Follow-up    - in the next 1-2 week(s) with PCP  - in 6-8 weeks with general neurology or stroke CAIO ([phone])    No further stroke evaluation is recommended, so we will sign off. Please contact us with any additional questions.    The Stroke Staff is Dr. Stafford.    Derek Solis  Medical Student  To page a member of the stroke/neurocritical care service, click here:  AMCOM   Choose \"On Call\" tab at top, then search dropdown box for \"Neurology Adult\", select location, press Enter, then look for stroke/neuro ICU/telestroke.    Attestation  I, Mala Chacko CNP, was present with the medical/CAIO student who participated in the service and in the documentation of the note.  I have verified the history and personally performed the physical exam and medical decision making.  I agree with the assessment and plan of care as documented in the note.       Mala Chacko CNP  ______________________________________________________    Clinically Significant Risk Factors                # Thrombocytopenia: Lowest platelets = 112 in last 2 days, will monitor for bleeding                             Medications   Scheduled Meds  Current Facility-Administered Medications   Medication Dose Route Frequency Provider Last Rate Last Admin    apixaban ANTICOAGULANT (ELIQUIS) tablet 5 mg  5 mg Oral Q12H Mala Chacko CNP        atorvastatin (LIPITOR) tablet 40 mg  40 mg Oral QPM Mala Chacko" PATRIA Conrad        metoprolol succinate ER (TOPROL XL) 24 hr tablet 50 mg  50 mg Oral Daily Javieru-Twyla Carlson MD   50 mg at 06/04/24 0816    sertraline (ZOLOFT) tablet 50 mg  50 mg Oral or Feeding Tube Daily Javieru-Twyla Carlson MD   50 mg at 06/03/24 2033    sodium chloride (PF) 0.9% PF flush 3 mL  3 mL Intracatheter Q8H Gabriela Schuler MD   3 mL at 06/04/24 1203       Infusion Meds  Current Facility-Administered Medications   Medication Dose Route Frequency Provider Last Rate Last Admin    Medication Instruction - Avoid dextrose in IV solutions   Intravenous Continuous PRN Gabriela Schuler MD        niCARdipine 40 mg in 200 mL NS (CARDENE) infusion  2.5-15 mg/hr Intravenous Continuous PRN Linden Yu MD        sodium chloride 0.9 % infusion   Intravenous Continuous PRN Linden Yu MD           PRN Meds  Current Facility-Administered Medications   Medication Dose Route Frequency Provider Last Rate Last Admin    acetaminophen (TYLENOL) tablet 650 mg  650 mg Oral Q4H PRN Gabriela Schuler MD        Or    acetaminophen (TYLENOL) oral liquid 650 mg  650 mg Per NG tube Q4H PRN Gabriela Schuler MD        labetalol (NORMODYNE/TRANDATE) injection 10 mg  10 mg Intravenous Q10 Min PRN Linden Yu MD        Or    hydrALAZINE (APRESOLINE) injection 10 mg  10 mg Intravenous Q10 Min PRN Linden Yu MD        hydrALAZINE (APRESOLINE) injection 10-20 mg  10-20 mg Intravenous Q30 Min PRN Gabriela Schuler MD        labetalol (NORMODYNE/TRANDATE) injection 10-20 mg  10-20 mg Intravenous Q10 Min PRN Gabriela Schuler MD        lidocaine (LMX4) cream   Topical Q1H PRN Gabriela Schuler MD        lidocaine 1 % 0.1-1 mL  0.1-1 mL Other Q1H PRN Gabriela Schuler MD        Medication Instruction - Avoid dextrose in IV solutions   Intravenous Continuous PRN Gabriela Schuler MD        metoprolol (LOPRESSOR) injection 5 mg  5 mg Intravenous Q5 Min PRN  Gabriela Schuler MD        niCARdipine 40 mg in 200 mL NS (CARDENE) infusion  2.5-15 mg/hr Intravenous Continuous PRN Linden Yu MD        sodium chloride (PF) 0.9% PF flush 3 mL  3 mL Intracatheter q1 min prn Gabriela Schuler MD        sodium chloride 0.9 % infusion   Intravenous Continuous PRN iLnden Yu MD              PHYSICAL EXAMINATION  Temp:  [97.9  F (36.6  C)-99.3  F (37.4  C)] 98.4  F (36.9  C)  Pulse:  [] 112  Resp:  [0-40] 5  BP: ()/(51-94) 100/71  SpO2:  [91 %-100 %] 93 %      General Exam  General:  patient lying in bed without any acute distress    HEENT:  normocephalic/atraumatic    Neuro Exam  Mental Status:  alert, oriented x 3, follows commands, speech clear and fluent, naming and repetition normal  Cranial Nerves:  visual fields intact, EOMI with normal smooth pursuit, hearing not formally tested but intact to conversation, shoulder shrug equal bilaterally, mild R facial weakness  Motor:  no abnormal movements, able to move all limbs antigravity spontaneously with no signs of hemiparesis observed, no pronator drift   Sensory:  light touch sensation intact and symmetric throughout upper and lower extremities, no extinction on double simultaneous stimulation   Coordination:  normal finger-to-nose and heel-to-shin bilaterally without dysmetria    Stroke Scales    NIHSS  1a. Level of Consciousness 0-->Alert, keenly responsive   1b. LOC Questions 0-->Answers both questions correctly   1c. LOC Commands 0-->Performs both tasks correctly   2.   Best Gaze 0-->Normal   3.   Visual 0-->No visual loss   4.   Facial Palsy 1-->Minor paralysis (flattened nasolabial fold, asymmetry on smiling)   5a. Motor Arm, Left 0-->No drift, limb holds 90 (or 45) degrees for full 10 secs   5b. Motor Arm, Right 0-->No drift, limb holds 90 (or 45) degrees for full 10 secs   6a. Motor Leg, Left 0-->No drift, leg holds 30 degree position for full 5 secs   6b. Motor Leg, right 0-->No  drift, leg holds 30 degree position for full 5 secs   7.   Limb Ataxia 0-->Absent   8.   Sensory 0-->Normal, no sensory loss   9.   Best Language 0-->No aphasia, normal   10. Dysarthria 0-->Normal   11. Extinction and Inattention  0-->No abnormality   Total 1 (06/04/24 0930)       mRS: 1    Imaging  I personally reviewed all imaging; relevant findings per HPI.     Lab Results Data   CBC  Recent Labs   Lab 06/04/24 0536 06/03/24 1002 06/02/24 2324   WBC 12.8* 12.9* 12.4*   RBC 5.45 5.70 5.34   HGB 16.4 17.2 16.2   HCT 47.2 49.1 45.7   * 131* 128*     Basic Metabolic Panel    Recent Labs   Lab 06/04/24 0536 06/03/24 2207 06/03/24  1604 06/03/24  1002 06/03/24  0306 06/02/24 2324     --   --  140  --  141   POTASSIUM 4.2  --   --  4.1  --  4.0   CHLORIDE 105  --   --  106  --  106   CO2 21*  --   --  20*  --  22   BUN 16.0  --   --  14.3  --  14.8   CR 1.07  --   --  1.03  --  1.10   * 112* 105* 141*   < > 125*   EVELYNE 9.0  --   --  9.0  --  8.9    < > = values in this interval not displayed.       INR    Recent Labs   Lab Test 06/04/24 0536 06/02/24 2324   INR 1.14 1.15      Lipid Profile    Recent Labs   Lab Test 06/02/24 2324   CHOL 180   HDL 29*   *   TRIG 223*     A1C    Recent Labs   Lab Test 06/02/24 2324   A1C 5.1     Troponin    Recent Labs   Lab 06/03/24 1002 06/02/24 2324   CTROPT 49* 57*          Data     I personally examined and evaluated the patient today. At the time of my evaluation and management the patient was in critical condition today due to stroke. I personally managed chart review, education. Key decisions made today included transfer. I spent a total of 35 minutes providing critical care services, evaluating the patient, directing care and reviewing laboratory values and radiologic reports.

## 2024-06-04 NOTE — PLAN OF CARE
Goal Outcome Evaluation:    Pt here with L CVA, s/p TNK. A&Ox4. Neuros intact. VSS on RA, SBP goal <180. Tele: Afib CVR. Reg diet, thin liquids. Takes pills whole. Up with SBA. Denies pain. Pt scoring green on the Aggression Stop Light Tool. Plan bedrest w/bathroom privileges, monitor HR; HR noted to go up to 160s w/ activity, MD aware. Discharge pending medical stability, likely to home.    48

## 2024-06-05 ENCOUNTER — APPOINTMENT (OUTPATIENT)
Dept: OCCUPATIONAL THERAPY | Facility: CLINIC | Age: 59
DRG: 063 | End: 2024-06-05
Payer: COMMERCIAL

## 2024-06-05 ENCOUNTER — ALLIED HEALTH/NURSE VISIT (OUTPATIENT)
Dept: NEUROLOGY | Facility: CLINIC | Age: 59
End: 2024-06-05
Payer: COMMERCIAL

## 2024-06-05 VITALS
SYSTOLIC BLOOD PRESSURE: 125 MMHG | WEIGHT: 236.99 LBS | RESPIRATION RATE: 18 BRPM | OXYGEN SATURATION: 92 % | HEART RATE: 80 BPM | DIASTOLIC BLOOD PRESSURE: 81 MMHG | TEMPERATURE: 98.8 F

## 2024-06-05 DIAGNOSIS — Z00.6 EXAMINATION OF PARTICIPANT OR CONTROL IN CLINICAL RESEARCH: Primary | ICD-10-CM

## 2024-06-05 LAB
GLUCOSE BLDC GLUCOMTR-MCNC: 104 MG/DL (ref 70–99)
GLUCOSE BLDC GLUCOMTR-MCNC: 112 MG/DL (ref 70–99)

## 2024-06-05 PROCEDURE — 36415 COLL VENOUS BLD VENIPUNCTURE: CPT | Performed by: PSYCHIATRY & NEUROLOGY

## 2024-06-05 PROCEDURE — 99239 HOSP IP/OBS DSCHRG MGMT >30: CPT | Performed by: HOSPITALIST

## 2024-06-05 PROCEDURE — 250N000013 HC RX MED GY IP 250 OP 250 PS 637: Performed by: NURSE PRACTITIONER

## 2024-06-05 PROCEDURE — 97530 THERAPEUTIC ACTIVITIES: CPT | Mod: GO

## 2024-06-05 PROCEDURE — 99001 SPECIMEN HANDLING PT-LAB: CPT | Performed by: PSYCHIATRY & NEUROLOGY

## 2024-06-05 PROCEDURE — 250N000013 HC RX MED GY IP 250 OP 250 PS 637: Performed by: HOSPITALIST

## 2024-06-05 RX ORDER — METOPROLOL SUCCINATE 100 MG/1
100 TABLET, EXTENDED RELEASE ORAL DAILY
Status: DISCONTINUED | OUTPATIENT
Start: 2024-06-06 | End: 2024-06-05 | Stop reason: HOSPADM

## 2024-06-05 RX ORDER — METOPROLOL SUCCINATE 100 MG/1
100 TABLET, EXTENDED RELEASE ORAL DAILY
Qty: 30 TABLET | Refills: 0 | Status: SHIPPED | OUTPATIENT
Start: 2024-06-06 | End: 2024-07-06

## 2024-06-05 RX ORDER — METOPROLOL SUCCINATE 25 MG/1
25 TABLET, EXTENDED RELEASE ORAL ONCE
Status: COMPLETED | OUTPATIENT
Start: 2024-06-05 | End: 2024-06-05

## 2024-06-05 RX ORDER — ATORVASTATIN CALCIUM 40 MG/1
40 TABLET, FILM COATED ORAL EVERY EVENING
Qty: 30 TABLET | Refills: 0 | Status: SHIPPED | OUTPATIENT
Start: 2024-06-05 | End: 2024-07-05

## 2024-06-05 RX ADMIN — METOPROLOL SUCCINATE 25 MG: 25 TABLET, EXTENDED RELEASE ORAL at 16:27

## 2024-06-05 RX ADMIN — APIXABAN 5 MG: 5 TABLET, FILM COATED ORAL at 11:44

## 2024-06-05 RX ADMIN — METOPROLOL SUCCINATE 75 MG: 50 TABLET, EXTENDED RELEASE ORAL at 08:21

## 2024-06-05 RX ADMIN — APIXABAN 5 MG: 5 TABLET, FILM COATED ORAL at 00:27

## 2024-06-05 ASSESSMENT — ACTIVITIES OF DAILY LIVING (ADL)
ADLS_ACUITY_SCORE: 24
ADLS_ACUITY_SCORE: 23
ADLS_ACUITY_SCORE: 24
ADLS_ACUITY_SCORE: 23
ADLS_ACUITY_SCORE: 24
ADLS_ACUITY_SCORE: 23
ADLS_ACUITY_SCORE: 24
ADLS_ACUITY_SCORE: 23
ADLS_ACUITY_SCORE: 24
ADLS_ACUITY_SCORE: 23
ADLS_ACUITY_SCORE: 24
ADLS_ACUITY_SCORE: 23
ADLS_ACUITY_SCORE: 24

## 2024-06-05 NOTE — PROGRESS NOTES
Trial: Determinants of Incident Stroke Cognitive Outcomes and Vascular Effects on RecoverY (DISCOVERY trial)     Participant or LAR/Surrogate Prospective Consent     IRB number: [id number]     PI: Srinivas Sahu MD PhD MS      Estimated duration of study: 4 years     On 06/05/24 the patient was screened and consented for the DISCOVERY study by the note author. Permission to approach the patient was granted by Mala Chacko NP. The current IRB approved consent form was reviewed and discussed at length with the patient. This included purpose, research hypothesis, nature of the research, study contacts, risks & benefits, procedures required for screening and enrollment, as well as all required follow up. Confidentiality issues, compensation for injury, and alternative treatments were explained. Patient was informed that participation is voluntary and that refusal to participate will involve no penalty or decrease benefits to which the subject is otherwise entitled. patient had the opportunity to read the entire consent, ask questions, express concerns and was offered sufficient time to consider the research trial. patient was able to clearly state what study participation involved and the associated requirements. All questions and concerns were addressed. The patient signed the consent form, and a copy of the signed consent form was given to patient. No study procedures were conducted before the consent form was signed.     In the opinion of the investigator and prior to consent, inclusion/exclusion criteria were reviewed and met.  [x] YES  [] NO        AIMEE Arboleda DISCOVERY Research Coordinator [email or web address]

## 2024-06-05 NOTE — PLAN OF CARE
Goal Outcome Evaluation:    Pt here with L CVA, s/p TNK. A&Ox4. Neuros intact. VSS on RA, BP goal <180/105. Tele: Afib CVR/RVR. Reg diet, thin liquids. Takes pills whole. Up with SBA. Denies pain. Scab on neck from recent dermatology removal. Pt scoring green on the Aggression Stop Light Tool.  Discharging to home    All discharge instructions given; no further questions. All belongings sent with patient. Discharge meds given to patient. Tele and both IVs removed before discharge. Patient discharged to home with spouse.

## 2024-06-05 NOTE — PLAN OF CARE
Occupational Therapy Discharge Summary    Reason for therapy discharge:    All goals and outcomes met, no further needs identified.    Progress towards therapy goal(s). See goals on Care Plan in [facility] electronic health record for goal details.  Goals met    Therapy recommendation(s):    Pt near baseline for mobility/self cares. Pt with incr HR. Pt currently mobilizing in hallway and completing self cares with Mod I for incr time. Recommend pt return home w/ family assist for heavy IADL tasks and driving initially, once medically ready. No further acute OT needs.

## 2024-06-05 NOTE — PLAN OF CARE
Goal Outcome Evaluation:       Reason for Admission: L CVA s/p TNK  Cognitive/Mentation: A/Ox4  Neuros/CMS: Intact  VS: Stable on RA, BP <180/105. Orders for BR w/ bathroom privileges d/t elevated HR w/ ambulation  Tele: Sinus Tach.  GI: Continent. No BM this shift  : Continent.  Pulmonary: LS clear.  Pain: denies.   Drains/Lines: 2 PIVs SL  Skin: Intact ex scab on upper chest/neck  Activity: SBA   Diet: Regular with thin liquids. Takes pills whole.   Therapies recs: home w/ assist  Discharge: medically ready, anticipated in 1-3 days  Aggression Stoplight Tool: green

## 2024-06-05 NOTE — DISCHARGE SUMMARY
[facility]  Hospitalist Discharge Summary      Date of Admission:  6/2/2024  Date of Discharge:  6/5/2024  Discharging Provider: Twyla Villa MD  Discharge Service: Hospitalist Service    Discharge Diagnoses   Acute ischemic stroke   Paroxysmal atrial fibrillation.  A-fib RVR    Clinically Significant Risk Factors          Follow-ups Needed After Discharge   Follow-up Appointments     Follow-up and recommended labs and tests       Follow up with primary care provider, Alonso Liang MD, within 7   days for hospital follow- up.  The following labs/tests are recommended:   None.            Unresulted Labs Ordered in the Past 30 Days of this Admission       No orders found from 5/3/2024 to 6/3/2024.        These results will be followed up by     Discharge Disposition   Discharged to home  Condition at discharge: Stable    Hospital Course   Dave Cole is a 59 year old male with a past medical history of paroxysmal atrial fibrillation presents to hospital with a stroke.    Acute ischemic stroke  *Patient presents to hospital with acute right-sided weakness involving his left face, upper and lower extremities.  He was also found to have expressive aphasia.    *CT and CTA concerning for an acute clot in the M1 distribution of the left middle cerebral artery.  He received TNKase at 11:55 PM. Symptoms resolved while the patient was in the ed.  Plan  -- Brain MRI - Acute infarcts within the left basal ganglia and insula  -- Echocardiogram - EF 50 - 55%, aortic root aneurysm.  -- continue monitor on telemetry  -- Neurology consult - input appreciated.  Etiology is cardioembolic.  Now started on Eliquis 5 mg twice daily.  -- speech, PT, OT - recommends discharge home .    Paroxysmal atrial fibrillation.  A-fib RVR  -- PTA Toprol will increase dose from 50 mg to 100 mg daily due to uncontrolled heart rates especially with ambulation.  --Case discussed with cardiology, no further recs at this time.  Rate control for now  --Patient to follow-up with his outpatient cardiologist on discharge.  -- Now started on Eliquis 5 mg twice daily per Neurology.  -- cont telemonitoring  -- Patient to follow up with his cardiologist on discharge.    Consultations This Hospital Stay   NEUROLOGY IP STROKE CONSULT  SPEECH LANGUAGE PATH ADULT IP CONSULT  PHARMACY IP CONSULT  PHARMACY IP CONSULT  PHARMACY IP CONSULT  PHYSICAL THERAPY ADULT IP CONSULT  OCCUPATIONAL THERAPY ADULT IP CONSULT  REHAB ADMISSIONS LIAISON IP CONSULT  CARE MANAGEMENT / SOCIAL WORK IP CONSULT  PHARMACY LIAISON FOR MEDICATION COVERAGE CONSULT  SMOKING CESSATION PROGRAM IP CONSULT    Code Status   Full Code    Time Spent on this Encounter   I, Twyla Villa MD, personally saw the patient today and spent greater than 30 minutes discharging this patient.       Twyla Villa MD  [facility] NEUROSCIENCE UNIT  [address] [place] [address]  Phone: [phone]  ______________________________________________________________________    Physical Exam   Vital Signs: Temp: 98.8  F (37.1  C) Temp src: Oral BP: 114/74 Pulse: 103   Resp: 18 SpO2: 92 % O2 Device: None (Room air)    Weight: 236 lbs 15.91 oz  General Appearance: Well appearing for stated age.  Respiratory: CTAB, no rales or ronchi  Cardiovascular: S1, S2 normal, no murmurs  GI: non-tender on palpation, BS present         Primary Care Physician   Alonso Liang MD    Discharge Orders      Reason for your hospital stay    You were treated for a stroke     Follow-up and recommended labs and tests     Follow up with primary care provider, Alonso Liang MD, within 7 days for hospital follow- up.  The following labs/tests are recommended: None.     Activity    Your activity upon discharge: activity as tolerated     Diet    Follow this diet upon discharge: Orders Placed This Encounter      Regular Diet Adult     Stroke Hospital Follow Up (for neurologist use only)    [facility]  will call you to coordinate care as prescribed by your provider. If you don t hear from a representative within 2 business days, please call [phone].         Significant Results and Procedures   Most Recent 3 CBC's:  Recent Labs   Lab Test 06/04/24  0536 06/03/24  1002 06/02/24  2324   WBC 12.8* 12.9* 12.4*   HGB 16.4 17.2 16.2   MCV 87 86 86   * 131* 128*     Most Recent 3 BMP's:  Recent Labs   Lab Test 06/05/24  1204 06/05/24  0152 06/04/24  0536 06/03/24  1604 06/03/24  1002 06/03/24  0306 06/02/24  2324   NA  --   --  138  --  140  --  141   POTASSIUM  --   --  4.2  --  4.1  --  4.0   CHLORIDE  --   --  105  --  106  --  106   CO2  --   --  21*  --  20*  --  22   BUN  --   --  16.0  --  14.3  --  14.8   CR  --   --  1.07  --  1.03  --  1.10   ANIONGAP  --   --  12  --  14  --  13   EVELYNE  --   --  9.0  --  9.0  --  8.9   * 112* 107*   < > 141*   < > 125*    < > = values in this interval not displayed.   ,   Results for orders placed or performed during the hospital encounter of 06/02/24   CT Head w/o Contrast    Narrative    EXAM: CT HEAD W/O CONTRAST, CTA HEAD NECK W CONTRAST  LOCATION: [facility]  DATE: 6/2/2024    INDICATION: Code Stroke to evaluate for potential thrombolysis and thrombectomy. PLEASE READ IMMEDIATELY. Right-sided weakness.  COMPARISON: None.  CONTRAST: 67mL Isovue 370  TECHNIQUE: Head and neck CT angiogram with IV contrast. Noncontrast head CT followed by axial helical CT images of the head and neck vessels obtained during the arterial phase of intravenous contrast administration. Axial 2D reconstructed images and   multiplanar 3D MIP reconstructed images of the head and neck vessels were performed by the technologist. Dose reduction techniques were used. All stenosis measurements made according to NASCET criteria unless otherwise specified.    FINDINGS:   NONCONTRAST HEAD CT:   INTRACRANIAL CONTENTS: No intracranial hemorrhage, extraaxial  collection, or mass effect.. There is increased density within the M1 segment of the left middle cerebral artery. This is suggestive of an acute clot. There is appropriate gray-white matter   differentiation. The ventricular system, basal cisterns and the cortical sulci are consistent with the patient's age.    VISUALIZED ORBITS/SINUSES/MASTOIDS: No intraorbital abnormality. No paranasal sinus mucosal disease. No middle ear or mastoid effusion.    BONES/SOFT TISSUES: No acute abnormality.    HEAD CTA:  ANTERIOR CIRCULATION: There is a filling defect within the distal portion of the M1 segment of the left middle cerebral artery of approximately 4 mm. This is best visualized on axial image #32 series #906. There is a vessel cutoff at the posterior   division M2 and M3 junction with no contrast seen distally. This is best visualized on axial image #32 series #906 and sagittal image #5. There is no significant stenosis, aneurysm or high flow vascular malformation. Standard [place] anatomy.    POSTERIOR CIRCULATION: No stenosis/occlusion, aneurysm, or high flow vascular malformation. Balanced vertebral arteries supply a normal basilar artery.     DURAL VENOUS SINUSES: Expected enhancement of the major dural venous sinuses.    NECK CTA:  RIGHT CAROTID: No measurable stenosis or dissection.    LEFT CAROTID: No measurable stenosis or dissection.    VERTEBRAL ARTERIES: No focal stenosis or dissection. Balanced vertebral arteries.    AORTIC ARCH: Classic aortic arch anatomy with no significant stenosis at the origin of the great vessels.    NONVASCULAR STRUCTURES: The lung apices are clear. There are minimal degenerative changes of the cervical spine.      Impression    IMPRESSION:   HEAD CT:  1.  Increased density M1 segment left middle cerebral artery suggestive of acute clot.  2.  No CT evidence of hemorrhage, midline shift or focal area of edema.    HEAD CTA:   1.  Filling defect 4 mm in length M1 segment left  middle cerebral artery.  2.  Vessel cutoff at junction of the posterior division branch M2 and M3 region of the left middle cerebral artery with no contrast distally.  3.  No discrete aneurysm, high flow vascular malformation or significant stenosis involving the arteries of the [place] of [place].    NECK CTA:  1.  Normal configuration of the great vessels off the aortic arch with no significant stenosis of their origins.  2.  No significant stenosis or irregularity involving the arteries of the neck by NASCET criteria..  3.  No radiographic evidence of dissection.    Head CT following the oral communicated by phone to Dr. Christiansen at 11:40 PM. CTA results were communicated to Dr. Christiansen at 11:52 PM on 6/2/2024.   CTA Head Neck with Contrast    Narrative    EXAM: CT HEAD W/O CONTRAST, CTA HEAD NECK W CONTRAST  LOCATION: [facility]  DATE: 6/2/2024    INDICATION: Code Stroke to evaluate for potential thrombolysis and thrombectomy. PLEASE READ IMMEDIATELY. Right-sided weakness.  COMPARISON: None.  CONTRAST: 67mL Isovue 370  TECHNIQUE: Head and neck CT angiogram with IV contrast. Noncontrast head CT followed by axial helical CT images of the head and neck vessels obtained during the arterial phase of intravenous contrast administration. Axial 2D reconstructed images and   multiplanar 3D MIP reconstructed images of the head and neck vessels were performed by the technologist. Dose reduction techniques were used. All stenosis measurements made according to NASCET criteria unless otherwise specified.    FINDINGS:   NONCONTRAST HEAD CT:   INTRACRANIAL CONTENTS: No intracranial hemorrhage, extraaxial collection, or mass effect.. There is increased density within the M1 segment of the left middle cerebral artery. This is suggestive of an acute clot. There is appropriate gray-white matter   differentiation. The ventricular system, basal cisterns and the cortical sulci are consistent with the patient's  age.    VISUALIZED ORBITS/SINUSES/MASTOIDS: No intraorbital abnormality. No paranasal sinus mucosal disease. No middle ear or mastoid effusion.    BONES/SOFT TISSUES: No acute abnormality.    HEAD CTA:  ANTERIOR CIRCULATION: There is a filling defect within the distal portion of the M1 segment of the left middle cerebral artery of approximately 4 mm. This is best visualized on axial image #32 series #906. There is a vessel cutoff at the posterior   division M2 and M3 junction with no contrast seen distally. This is best visualized on axial image #32 series #906 and sagittal image #5. There is no significant stenosis, aneurysm or high flow vascular malformation. Standard [place] of [place] anatomy.    POSTERIOR CIRCULATION: No stenosis/occlusion, aneurysm, or high flow vascular malformation. Balanced vertebral arteries supply a normal basilar artery.     DURAL VENOUS SINUSES: Expected enhancement of the major dural venous sinuses.    NECK CTA:  RIGHT CAROTID: No measurable stenosis or dissection.    LEFT CAROTID: No measurable stenosis or dissection.    VERTEBRAL ARTERIES: No focal stenosis or dissection. Balanced vertebral arteries.    AORTIC ARCH: Classic aortic arch anatomy with no significant stenosis at the origin of the great vessels.    NONVASCULAR STRUCTURES: The lung apices are clear. There are minimal degenerative changes of the cervical spine.      Impression    IMPRESSION:   HEAD CT:  1.  Increased density M1 segment left middle cerebral artery suggestive of acute clot.  2.  No CT evidence of hemorrhage, midline shift or focal area of edema.    HEAD CTA:   1.  Filling defect 4 mm in length M1 segment left middle cerebral artery.  2.  Vessel cutoff at junction of the posterior division branch M2 and M3 region of the left middle cerebral artery with no contrast distally.  3.  No discrete aneurysm, high flow vascular malformation or significant stenosis involving the arteries of the [place] of [place].    NECK  CTA:  1.  Normal configuration of the great vessels off the aortic arch with no significant stenosis of their origins.  2.  No significant stenosis or irregularity involving the arteries of the neck by NASCET criteria..  3.  No radiographic evidence of dissection.    Head CT following the oral communicated by phone to Dr. Christiansen at 11:40 PM. CTA results were communicated to Dr. Christiansen at 11:52 PM on 6/2/2024.   MR Brain w/o Contrast    Narrative    EXAM: MR BRAIN W/O CONTRAST  LOCATION: [facility]  DATE: 6/3/2024    INDICATION: Acute ischemic stroke.  COMPARISON: CTA head and neck 6/2/2024.  TECHNIQUE: Routine multiplanar multisequence head MRI without intravenous contrast.    FINDINGS:  INTRACRANIAL CONTENTS: Acute infarcts are seen involving the left caudate head, putamen and a small focus within the mid left insular cortex. No other infarct. Mass effect on the left lateral ventricle without midline shift. No hemorrhage. No mass, acute   hemorrhage, or extra-axial fluid collections. Normal brain parenchymal signal. Normal ventricles and sulci. Normal position of the cerebellar tonsils.     SELLA: No abnormality accounting for technique.    OSSEOUS STRUCTURES/SOFT TISSUES: Normal marrow signal. The major intracranial vascular flow voids are maintained.     ORBITS: No abnormality accounting for technique.     SINUSES/MASTOIDS: Scattered paranasal sinus mucosal disease. No middle ear or mastoid effusion.       Impression    IMPRESSION:  1.  Acute infarcts within the left basal ganglia and insula. Minimal mass effect on the left lateral ventricle without hemorrhage or significant mass effect elsewhere. No midline shift.    2.  Brain MRI otherwise normal.   CT Head w/o Contrast    Narrative    EXAM: CT HEAD W/O CONTRAST, CTA HEAD NECK W CONTRAST  LOCATION: [facility]  DATE: 6/3/2024    INDICATION: S/p tnkas 24 hour follow up.  COMPARISON: None.  CONTRAST: 67 mL  Isovue-370.  TECHNIQUE: Head and neck CT angiogram with IV contrast. Noncontrast head CT followed by axial helical CT images of the head and neck vessels obtained during the arterial phase of intravenous contrast administration. Axial 2D reconstructed images and   multiplanar 3D MIP reconstructed images of the head and neck vessels were performed by the technologist. Dose reduction techniques were used. All stenosis measurements made according to NASCET criteria unless otherwise specified.    FINDINGS:   NONCONTRAST HEAD CT:   INTRACRANIAL CONTENTS: No intracranial hemorrhage, extraaxial collection, or mass effect. Evolving left basal ganglia infarct again noted with mild mass effect on the left lateral ventricle similar to the prior brain MRI. No definite new infarct by CT.   Normal parenchymal attenuation. Normal ventricles and sulci.     VISUALIZED ORBITS/SINUSES/MASTOIDS: No intraorbital abnormality. No paranasal sinus mucosal disease. No middle ear or mastoid effusion.    BONES/SOFT TISSUES: No acute abnormality.    HEAD CTA:  ANTERIOR CIRCULATION: Resolution of previously seen left M1 and more distal MCA branch filling defects. No new filling defect. Arborization is symmetric and normal.    POSTERIOR CIRCULATION: No stenosis/occlusion, aneurysm, or high flow vascular malformation. Balanced vertebral arteries supply a normal basilar artery.     DURAL VENOUS SINUSES: Expected enhancement of the major dural venous sinuses.    NECK CTA:  RIGHT CAROTID: No measurable stenosis or dissection.    LEFT CAROTID: No measurable stenosis or dissection.    VERTEBRAL ARTERIES: No focal stenosis or dissection. Balanced vertebral arteries.    AORTIC ARCH: Classic aortic arch anatomy with no significant stenosis at the origin of the great vessels.    NONVASCULAR STRUCTURES: Unremarkable.      Impression    IMPRESSION:   HEAD CT:  1.  Evolving left basal ganglia infarct is similar to the previous MRI with minimal mass effect on  the left lateral ventricle. No hemorrhage or definite new infarct by CT..    HEAD CTA:   1.  Resolution of previously seen filling defects in the left M1 and more distal MCA branches. No definite new filling defect/occlusion.    NECK CTA:  1.  Normal neck CTA.   CTA Head Neck with Contrast    Narrative    EXAM: CT HEAD W/O CONTRAST, CTA HEAD NECK W CONTRAST  LOCATION: [facility]  DATE: 6/3/2024    INDICATION: S/p tnkas 24 hour follow up.  COMPARISON: None.  CONTRAST: 67 mL Isovue-370.  TECHNIQUE: Head and neck CT angiogram with IV contrast. Noncontrast head CT followed by axial helical CT images of the head and neck vessels obtained during the arterial phase of intravenous contrast administration. Axial 2D reconstructed images and   multiplanar 3D MIP reconstructed images of the head and neck vessels were performed by the technologist. Dose reduction techniques were used. All stenosis measurements made according to NASCET criteria unless otherwise specified.    FINDINGS:   NONCONTRAST HEAD CT:   INTRACRANIAL CONTENTS: No intracranial hemorrhage, extraaxial collection, or mass effect. Evolving left basal ganglia infarct again noted with mild mass effect on the left lateral ventricle similar to the prior brain MRI. No definite new infarct by CT.   Normal parenchymal attenuation. Normal ventricles and sulci.     VISUALIZED ORBITS/SINUSES/MASTOIDS: No intraorbital abnormality. No paranasal sinus mucosal disease. No middle ear or mastoid effusion.    BONES/SOFT TISSUES: No acute abnormality.    HEAD CTA:  ANTERIOR CIRCULATION: Resolution of previously seen left M1 and more distal MCA branch filling defects. No new filling defect. Arborization is symmetric and normal.    POSTERIOR CIRCULATION: No stenosis/occlusion, aneurysm, or high flow vascular malformation. Balanced vertebral arteries supply a normal basilar artery.     DURAL VENOUS SINUSES: Expected enhancement of the major dural venous  sinuses.    NECK CTA:  RIGHT CAROTID: No measurable stenosis or dissection.    LEFT CAROTID: No measurable stenosis or dissection.    VERTEBRAL ARTERIES: No focal stenosis or dissection. Balanced vertebral arteries.    AORTIC ARCH: Classic aortic arch anatomy with no significant stenosis at the origin of the great vessels.    NONVASCULAR STRUCTURES: Unremarkable.      Impression    IMPRESSION:   HEAD CT:  1.  Evolving left basal ganglia infarct is similar to the previous MRI with minimal mass effect on the left lateral ventricle. No hemorrhage or definite new infarct by CT..    HEAD CTA:   1.  Resolution of previously seen filling defects in the left M1 and more distal MCA branches. No definite new filling defect/occlusion.    NECK CTA:  1.  Normal neck CTA.   Echocardiogram Complete w Bubble study - For age 60 yrs or less     Value    LVEF  55-60%    Narrative    [id number]  [id number]  [id number]  [id number]^ARVIN^EDWIGE^KRISTIN     [facility]  Echocardiography Laboratory  [address] [place] [address]     Name: ETTA SAINI  MRN: [id number]  : 1965  Study Date: 2024 01:39 PM  Age: 59 yrs  Gender: Male  Patient Location: [facility]  Reason For Study: Cerebrovascular Incident  Ordering Physician: EDWIGE SHERIDAN  Performed By: Kelly Christine     BSA: 2.4 m2  Height: 76 in  Weight: 235 lb  HR: 86  BP: 93/64 mmHg  ______________________________________________________________________________  Procedure  Complete Bubble Echo Adult.  ______________________________________________________________________________  Interpretation Summary     The visual ejection fraction is 55-60%.  Left ventricular systolic function is normal.  Aortic root aneurysm is present.  The rhythm was atrial fibrillation.  The study was technically difficult.  ______________________________________________________________________________  Left Ventricle  The left ventricle is normal in size. There is mild  concentric left  ventricular hypertrophy. Diastolic function not assessed due to atrial  fibrillation. The visual ejection fraction is 55-60%. Left ventricular  systolic function is normal.     Right Ventricle  The right ventricle is normal in size and function.     Atria  Normal left atrial size. Right atrial size is normal. There is no color  Doppler evidence of an atrial shunt.     Mitral Valve  There is trace mitral regurgitation.     Tricuspid Valve  The right ventricular systolic pressure is approximated at 14mmHg plus the  right atrial pressure. There is trace tricuspid regurgitation.     Aortic Valve  The aortic valve is trileaflet. No aortic regurgitation is present. No  hemodynamically significant valvular aortic stenosis.     Pulmonic Valve  There is no pulmonic valvular regurgitation. Normal pulmonic valve velocity.     Vessels  Aortic root aneurysm is present. (4.8 cm). Normal size ascending aorta. IVC  diameter <2.1 cm collapsing >50% with sniff suggests a normal RA pressure of 3  mmHg.     Pericardium  There is no pericardial effusion.     Rhythm  The rhythm was atrial fibrillation.  ______________________________________________________________________________  MMode/2D Measurements & Calculations     IVSd: 1.1 cm  LVIDd: 4.6 cm  LVIDs: 3.7 cm  LVPWd: 1.4 cm  FS: 20.1 %  LV mass(C)d: 216.1 grams  LV mass(C)dI: 91.1 grams/m2  Ao root diam: 4.8 cm  asc Aorta Diam: 3.8 cm  LVOT diam: 2.7 cm  LVOT area: 5.9 cm2  Ao root diam index Ht(cm/m): 2.5  Ao root diam index BSA (cm/m2): 2.0  Asc Ao diam index BSA (cm/m2): 1.6  Asc Ao diam index Ht(cm/m): 2.0  LA Volume (BP): 50.7 ml     LA Volume Index (BP): 21.4 ml/m2  RV Base: 4.1 cm  RWT: 0.60     Doppler Measurements & Calculations  MV E max allison: 68.8 cm/sec  MV dec slope: 280.3 cm/sec2  MV dec time: 0.25 sec  Ao V2 max: 61.3 cm/sec  Ao max P.0 mmHg  Ao V2 mean: 41.6 cm/sec  Ao mean P.0 mmHg  Ao V2 VTI: 9.2 cm  SAMMI(I,D): 5.3 cm2  SAMMI(V,D): 5.5 cm2  LV  V1 max P.3 mmHg  LV V1 max: 57.3 cm/sec  LV V1 VTI: 8.3 cm  SV(LVOT): 48.8 ml  SI(LVOT): 20.6 ml/m2  PA V2 max: 56.5 cm/sec  PA max P.3 mmHg  PA acc time: 0.14 sec  AV Ronald Ratio (DI): 0.93  SAMMI Index (cm2/m2): 2.2  E/E' av.1     Lateral E/e': 4.9  Medial E/e': 5.3  RV S Ronald: 16.5 cm/sec     ______________________________________________________________________________  Report approved by: Alva Bernardo 2024 03:56 PM             Discharge Medications   Current Discharge Medication List        START taking these medications    Details   apixaban ANTICOAGULANT (ELIQUIS) 5 MG tablet Take 1 tablet (5 mg) by mouth every 12 hours for 30 days  Qty: 60 tablet, Refills: 0    Associated Diagnoses: Acute CVA (cerebrovascular accident) (H); Atrial fibrillation with RVR (H)      atorvastatin (LIPITOR) 40 MG tablet Take 1 tablet (40 mg) by mouth every evening for 30 days  Qty: 30 tablet, Refills: 0    Associated Diagnoses: Acute CVA (cerebrovascular accident) (H); Atrial fibrillation with RVR (H)           CONTINUE these medications which have CHANGED    Details   metoprolol succinate ER (TOPROL XL) 100 MG 24 hr tablet Take 1 tablet (100 mg) by mouth daily for 30 days  Qty: 30 tablet, Refills: 0    Associated Diagnoses: Acute CVA (cerebrovascular accident) (H); Atrial fibrillation with RVR (H)           CONTINUE these medications which have NOT CHANGED    Details   fish oil-omega-3 fatty acids 1000 MG capsule Take 1 g by mouth daily (with dinner)      multivitamin, therapeutic (THERA-VIT) TABS tablet Take 1 tablet by mouth daily (with dinner)      sertraline (ZOLOFT) 50 MG tablet Take 50 mg by mouth daily      Vitamin D3 (CHOLECALCIFEROL) 25 mcg (1000 units) tablet Take 1 tablet by mouth daily (with dinner)           Allergies   Allergies   Allergen Reactions    Amoxicillin Hives and Rash    Oxycodone-Acetaminophen Palpitations    Sulfamethoxazole-Trimethoprim Hives and Rash

## 2024-06-06 ENCOUNTER — PATIENT OUTREACH (OUTPATIENT)
Dept: CARE COORDINATION | Facility: CLINIC | Age: 59
End: 2024-06-06
Payer: COMMERCIAL

## 2024-06-06 NOTE — PROGRESS NOTES
"  [facility]: Transitions of Care Outreach  Chief Complaint   Patient presents with    Clinic Care Coordination - Post Hospital       Most Recent Admission Date: 6/2/2024   Most Recent Admission Diagnosis: Expressive aphasia - R47.01  Facial droop - R29.810  Dysarthria - R47.1  Atrial fibrillation with RVR (H) - I48.91  Right sided weakness - R53.1  Acute CVA (cerebrovascular accident) (H) - I63.9     Most Recent Discharge Date: 6/5/2024   Most Recent Discharge Diagnosis: Acute CVA (cerebrovascular accident) (H) - I63.9  Atrial fibrillation with RVR (H) - I48.91  Right sided weakness - R53.1  Facial droop - R29.810  Expressive aphasia - R47.01  Dysarthria - R47.1     Transitions of Care Assessment    Discharge Assessment  How are you doing now that you are home?: \" I am doing really well \"  How are your symptoms? (Red Flag symptoms escalate to triage hotline per guidelines): Improved  Do you know how to contact your clinic care team if you have future questions or changes to your health status? : Yes  Does the patient have their discharge instructions? : Yes  Does the patient have questions regarding their discharge instructions? : No  Were you started on any new medications or were there changes to any of your previous medications? : Yes  Does the patient have all of their medications?: Yes  Do you have questions regarding any of your medications? : No  Do you have all of your needed medical supplies or equipment (DME)?  (i.e. oxygen tank, CPAP, cane, etc.): Yes    Post-op ([facility] CTA Only)  If the patient had a surgery or procedure, do they have any questions for a nurse?: No         [facility] Explained and offered Care Coordination support to eligible patients: Yes    Patient accepted? No    Follow up Plan     Discharge Follow-Up  Discharge follow up appointment scheduled in alignment with recommended follow up timeframe or Transitions of Risk Category? (Low = within 30 days; Moderate= within 14 days; " High= within 7 days): Yes  Discharge Follow Up Appointment Date: 06/11/24  Discharge Follow Up Appointment Scheduled with?: Specialty Care Provider    Future Appointments   Date Time Provider Department Center   8/19/2024  8:00 AM Barbi Dodson APRN CNP [facility]       Outpatient Plan as outlined on AVS reviewed with patient.    For any urgent concerns, please contact our 24 hour nurse triage line: [phone] ([phone])       NATHALIA Bustamante

## 2024-08-12 NOTE — PROGRESS NOTES
"__________________________________________________________      [facility] Sheyla Calles   [phone]  __________________________________________________________         History of Present Illness   Chief Complaint: Patient presents with:  RECHECK: Sometimes he misses a word or can't find it. Some memory concerns but that was an issue beforehand as well      Dave Cole is a 59 year old male presenting for follow-up for L MCA stroke.     He was hospitalized at [facility] on 6/2/24. Prior to the hospital stay, he had a past medical history of atrial fibrillation not on AC and thoracic aortic aneurysm. He presented to the hospital with acute right face/arm/leg weakness, speech change. CTA with L MCA sub occlusive thrombus; given TNK but thrombectomy deferred due to low NIH=1. MRI with L basal ganglia and inusla infarcts, felt secondary to cardioembolism given afib not on AC. He was started on Eliquis and Lipitor for stroke prevention.    Today, Dave presents with his wife who contributes to review of systems and history.  He notes that overall he feels he has physically recovered quite well from his stroke.  He perhaps has very subtle residual right facial asymmetry but denies any residual weakness of the right arm or leg.  He reports occasional difficulty \"stumbling over a word or 2;\" but does not feel that the speech difficulties are impacting his daily function.  He also notes that his memory \"has always been bad,\" but that he feels following the stroke symptoms have worsened.  He endorses difficulty with recent memory such as into conversations or information.  This appears to be more problematic in his home/personal life as he denies any significant impact on his ability to work as a  at [employer].  He does endorse change in mood which she describes as a feeling of \"apathy\" or \"malaise.\"  He feels less invested and engaged and feels that he is \"going through the " "motions.\"  He denies any overt symptoms of depression, although he is not enjoying things he previously did such as going to the gym.  He did meet with an individual therapist for the first time last week but is interested in considering additional resources.    He reports that at the time of presenting for his stroke he was also experiencing significant pain in his right leg and shortness of breath.  After discharging from [facility] he was actually admitted within the [facility] system 6/11 after he was found to have extensive right leg DVT and subsequently had a pulmonary embolus identified.  He then underwent SHAHNAZ and was found to have a small PFO; he met with structural cardiology who recommended against PFO closure given the small size and the fact that lifelong anticoagulation was already being recommended.  He reports that he also underwent hypercoagulable testing which was unrevealing.  He was on Lovenox for a short time, but is now back on Eliquis with recommendation for lifelong anticoagulation.    He reports that therapy with Eliquis has been going fine.  He finds the medication affordable and denies any excessive bruising or abnormal bleeding.  He continues on Lipitor without concern.  He had been undergoing medication adjustments in regards to the atrial fibrillation and at times this was causing low blood pressure and pulse; he reports that on recent blood pressure checks he has been averaging in the 128-130s/70s.      Modified Racquel Scale  Score: 1-No significant disability despite symptoms; able to carry out all usual duties and activities    Stroke Evaluation Summarized:  New results resulted and reviewed by me today are in BOLD below.  I personally reviewed the following neuroimaging studies today and the comments above reflect my own personal interpretation of the images: images: MRI brain and I also showed MRI to the patient myself today.    MRI/Head CT MRI: acute infarcts within the left basal " ganglia and insula.   Intracranial Vasculature CTA head 6/2:  Filling defect 4 mm in length M1 segment left middle cerebral artery  Vessel cutoff at junction of the posterior division branch M2 and M3 region of the left middle cerebral artery with no contrast distally  CTA head 6/3:   1. Resolution of previously seen filling defects in the left M1 and more distal MCA branches. No definite new filling defect/occlusion.    Cervical Vasculature CTA neck without significant stenosis or dissection       Echocardiogram Ejection fraction 55-60%. Aortic root aneurysm present. Rhythm was atrial fibrillation. Mild concentric left ventricular hypertrophy.   EKG/Telemetry Afib with RVR with PVC or ACC   Other Testing Not Applicable     Labs Lab Results   Component Value Date     (H) 06/02/2024    A1C 5.1 06/02/2024    CTROPT 49 (H) 06/03/2024    INR 1.14 06/04/2024    INR 1.15 06/02/2024               Home Medications     Current Outpatient Medications   Medication Sig Dispense Refill    apixaban ANTICOAGULANT (ELIQUIS) 5 MG tablet Take 5 mg by mouth 2 times daily      fish oil-omega-3 fatty acids 1000 MG capsule Take 1 g by mouth daily (with dinner)      multivitamin, therapeutic (THERA-VIT) TABS tablet Take 1 tablet by mouth daily (with dinner)      sertraline (ZOLOFT) 25 MG tablet Take 1 tablet (25 mg) by mouth daily Take 1 tablet by mouth daily in the morning for 2 weeks; may increase to 2 tablets daily after 2 weeks if desired/tolerated. 60 tablet 3    Vitamin D3 (CHOLECALCIFEROL) 25 mcg (1000 units) tablet Take 1 tablet by mouth daily (with dinner)      atorvastatin (LIPITOR) 40 MG tablet Take 1 tablet (40 mg) by mouth every evening for 30 days 30 tablet 0    metoprolol succinate ER (TOPROL XL) 100 MG 24 hr tablet Take 1 tablet (100 mg) by mouth daily for 30 days 30 tablet 0    sertraline (ZOLOFT) 50 MG tablet Take 50 mg by mouth daily (Patient not taking: Reported on 8/19/2024)       No current  "facility-administered medications for this visit.            Physical Examination     Physical Exam    Estimated body mass index is 28.55 kg/m  as calculated from the following:    Height as of this encounter: 1.924 m (6' 3.75\").    Weight as of this encounter: 105.7 kg (233 lb).    /77   Pulse 64   Ht 1.924 m (6' 3.75\")   Wt 105.7 kg (233 lb)   SpO2 97%   BMI 28.55 kg/m         General Exam  General: Sitting up in chair in no acute distress   Pulmonary:  no respiratory distress    Neurologic:  Mental Status:  alert, oriented x 3, follows commands, speech clear and fluent, naming and repetition normal  Cranial Nerves:  visual fields intact, PERRL, EOMI with normal smooth pursuit, facial sensation intact and symmetric, subtle flattening of R nasolabial fold but no significant weakness with muscle activation, hearing not formally tested but intact to conversation, palate elevation symmetric and uvula midline, no dysarthria, shoulder shrug strong bilaterally, tongue protrusion midline  Motor:  normal muscle tone and bulk, no abnormal movements, able to move all limbs spontaneously  Deltoid: 5/5 R, 5/5 L  Bicep: 5/5 R, 5/5 L  Ticep: 5/5 R, 5/5 L    Hip flexion: 4+/5 R, 5/5 L  Leg extension: 5/5 R, 5/5 L  Leg flexion: 5/5 R, 5/5 L  Foot extension: 5/5 R, 5/5 L  Foot flexion: 5/5 R, 5/5 L    Sensory:  light touch sensation intact and symmetric throughout upper and lower extremities, no extinction on double simultaneous stimulation   Coordination:  normal finger-to-nose and heel-to-shin bilaterally without dysmetria  Station/Gait:  deferred           Screenings and Questionnaires:     Tobacco:    Tobacco Use      Smoking status: Never      Smokeless tobacco: Never      Sleep Apnea:       Depression:      8/19/2024     7:48 AM   PHQ-2 ( 1999 [employer])   Q1: Little interest or pleasure in doing things 1   Q2: Feeling down, depressed or hopeless 1   PHQ-2 Score 2   Q1: Little interest or pleasure in doing things " Several days   Q2: Feeling down, depressed or hopeless Several days   PHQ-2 Score 2       Stroke Recovery and Risk Factors:      8/19/2024     7:55 AM   Stroke Questionnaire   Residual effects: Any residual effects from stroke? I have some symptoms, but I'm not sure if they're residual effects of the stroke   Residual effects: Describe memory concerns   Level of independence: Could you live alone? Yes   Quality of Life: Rating (0-100%) 80   Quality of Life: What work now or before stroke Employed full time   Quality of Life: Current work status Currently working   Quality of Life: How do you get around Drive myself   Quality of Life: Living situation before stroke With family or significant other   Quality of Life: Living situation now With family or significant other   Medication: How do you take your meds Myself, from a pillbox that I set up   Medication: Do you ever miss or forget meds Rarely   Risk Factor: Checking blood pressure at home Yes   Risk Factor: Usual blood pressure numbers 123 /80   Risk Factor: Checking blood sugar at home No   Risk Factor: Second-hand smoke at home No   Risk Factor: How much caffeine per day very little currently   Who completed this questionnaire? The patient independently             Assessment and Plan   Ischemic stroke of left middle cerebral artery due to cardioembolism in the setting of atrial fibrillation not on anticoagulation vs paradoxical embolus, June 2024    RLE DVT & PE, June 2024    Small PFO; structural cardiology recommends against closure as long as patient is on anticoagulation    -continue Eliquis 5mg BID indefinitely for secondary stroke prevention  -Goal BP is <130/80 with tighter control associated with improved vascular outcomes; recommend home blood pressure monitoring and keeping a BP log for primary care follow up  -continue Lipitor with ongoing titration for LDL goal 40-70; recheck LDL in coming 3-6 months for dose titration   -start zoloft for mood  support; 25mg x2 weeks then increase to 50mg     - Return in about 3 months (around 11/19/2024) for stroke, with GARRICK Dodson only, 60 min.     Stroke Education provided.  He will call us with any questions.  For any acute neurologic deficits he was advised to  go directly to the hospital rather than call the clinic.      Barbi Dodson, FE [facility]  Neurology  08/19/2024         ____________________________________________________________________    Billing:  Please note: for coding purposes this visit should be billed only as established and not new, since this patient was seen by my same subspecialty team (ealth Vascular Neurology) during the hospitalization that this visit is a follow up for.

## 2024-08-19 ENCOUNTER — OFFICE VISIT (OUTPATIENT)
Dept: NEUROLOGY | Facility: CLINIC | Age: 59
End: 2024-08-19
Attending: NURSE PRACTITIONER
Payer: COMMERCIAL

## 2024-08-19 VITALS
BODY MASS INDEX: 28.37 KG/M2 | HEIGHT: 76 IN | DIASTOLIC BLOOD PRESSURE: 77 MMHG | HEART RATE: 64 BPM | WEIGHT: 233 LBS | SYSTOLIC BLOOD PRESSURE: 133 MMHG | OXYGEN SATURATION: 97 %

## 2024-08-19 DIAGNOSIS — I63.9 CEREBROVASCULAR ACCIDENT (CVA), UNSPECIFIED MECHANISM (H): Primary | ICD-10-CM

## 2024-08-19 DIAGNOSIS — I48.91 ATRIAL FIBRILLATION, UNSPECIFIED TYPE (H): ICD-10-CM

## 2024-08-19 DIAGNOSIS — I63.9 ACUTE CVA (CEREBROVASCULAR ACCIDENT) (H): ICD-10-CM

## 2024-08-19 DIAGNOSIS — I48.91 ATRIAL FIBRILLATION WITH RVR (H): ICD-10-CM

## 2024-08-19 PROCEDURE — 99417 PROLNG OP E/M EACH 15 MIN: CPT | Performed by: NURSE PRACTITIONER

## 2024-08-19 PROCEDURE — 99215 OFFICE O/P EST HI 40 MIN: CPT | Performed by: NURSE PRACTITIONER

## 2024-08-19 RX ORDER — ATORVASTATIN CALCIUM 40 MG/1
40 TABLET, FILM COATED ORAL EVERY EVENING
Qty: 90 TABLET | Refills: 3 | Status: CANCELLED | OUTPATIENT
Start: 2024-08-19

## 2024-08-19 RX ORDER — SERTRALINE HYDROCHLORIDE 25 MG/1
25 TABLET, FILM COATED ORAL DAILY
Qty: 60 TABLET | Refills: 3 | Status: SHIPPED | OUTPATIENT
Start: 2024-08-19

## 2024-08-19 NOTE — LETTER
"8/19/2024      Dave Cole  [address]  [place] [id number]      Dear Colleague,    Thank you for referring your patient, Dave Cole, to the [facility]. Please see a copy of my visit note below.    __________________________________________________________      [facility]   [phone]  __________________________________________________________         History of Present Illness   Chief Complaint: Patient presents with:  RECHECK: Sometimes he misses a word or can't find it. Some memory concerns but that was an issue beforehand as well      Dave Cole is a 59 year old male presenting for follow-up for L MCA stroke.     He was hospitalized at [facility] on 6/2/24. Prior to the hospital stay, he had a past medical history of atrial fibrillation not on AC and thoracic aortic aneurysm. He presented to the hospital with acute right face/arm/leg weakness, speech change. CTA with L MCA sub occlusive thrombus; given TNK but thrombectomy deferred due to low NIH=1. MRI with L basal ganglia and inusla infarcts, felt secondary to cardioembolism given afib not on AC. He was started on Eliquis and Lipitor for stroke prevention.    Today, Dave presents with his wife who contributes to review of systems and history.  He notes that overall he feels he has physically recovered quite well from his stroke.  He perhaps has very subtle residual right facial asymmetry but denies any residual weakness of the right arm or leg.  He reports occasional difficulty \"stumbling over a word or 2;\" but does not feel that the speech difficulties are impacting his daily function.  He also notes that his memory \"has always been bad,\" but that he feels following the stroke symptoms have worsened.  He endorses difficulty with recent memory such as into conversations or information.  This appears to be more problematic in his home/personal life as he denies any " "significant impact on his ability to work as a  at [employer].  He does endorse change in mood which she describes as a feeling of \"apathy\" or \"malaise.\"  He feels less invested and engaged and feels that he is \"going through the motions.\"  He denies any overt symptoms of depression, although he is not enjoying things he previously did such as going to the gym.  He did meet with an individual therapist for the first time last week but is interested in considering additional resources.    He reports that at the time of presenting for his stroke he was also experiencing significant pain in his right leg and shortness of breath.  After discharging from [facility] he was actually admitted within the [facility] system 6/11 after he was found to have extensive right leg DVT and subsequently had a pulmonary embolus identified.  He then underwent SHAHNAZ and was found to have a small PFO; he met with structural cardiology who recommended against PFO closure given the small size and the fact that lifelong anticoagulation was already being recommended.  He reports that he also underwent hypercoagulable testing which was unrevealing.  He was on Lovenox for a short time, but is now back on Eliquis with recommendation for lifelong anticoagulation.    He reports that therapy with Eliquis has been going fine.  He finds the medication affordable and denies any excessive bruising or abnormal bleeding.  He continues on Lipitor without concern.  He had been undergoing medication adjustments in regards to the atrial fibrillation and at times this was causing low blood pressure and pulse; he reports that on recent blood pressure checks he has been averaging in the 128-130s/70s.      Modified Racquel Scale  Score: 1-No significant disability despite symptoms; able to carry out all usual duties and activities    Stroke Evaluation Summarized:  New results resulted and reviewed by me today are in BOLD below.  I personally reviewed the " following neuroimaging studies today and the comments above reflect my own personal interpretation of the images: images: MRI brain and I also showed MRI to the patient myself today.    MRI/Head CT MRI: acute infarcts within the left basal ganglia and insula.   Intracranial Vasculature CTA head 6/2:  Filling defect 4 mm in length M1 segment left middle cerebral artery  Vessel cutoff at junction of the posterior division branch M2 and M3 region of the left middle cerebral artery with no contrast distally  CTA head 6/3:   1. Resolution of previously seen filling defects in the left M1 and more distal MCA branches. No definite new filling defect/occlusion.    Cervical Vasculature CTA neck without significant stenosis or dissection       Echocardiogram Ejection fraction 55-60%. Aortic root aneurysm present. Rhythm was atrial fibrillation. Mild concentric left ventricular hypertrophy.   EKG/Telemetry Afib with RVR with PVC or ACC   Other Testing Not Applicable     Labs Lab Results   Component Value Date     (H) 06/02/2024    A1C 5.1 06/02/2024    CTROPT 49 (H) 06/03/2024    INR 1.14 06/04/2024    INR 1.15 06/02/2024               Home Medications     Current Outpatient Medications   Medication Sig Dispense Refill     apixaban ANTICOAGULANT (ELIQUIS) 5 MG tablet Take 5 mg by mouth 2 times daily       fish oil-omega-3 fatty acids 1000 MG capsule Take 1 g by mouth daily (with dinner)       multivitamin, therapeutic (THERA-VIT) TABS tablet Take 1 tablet by mouth daily (with dinner)       sertraline (ZOLOFT) 25 MG tablet Take 1 tablet (25 mg) by mouth daily Take 1 tablet by mouth daily in the morning for 2 weeks; may increase to 2 tablets daily after 2 weeks if desired/tolerated. 60 tablet 3     Vitamin D3 (CHOLECALCIFEROL) 25 mcg (1000 units) tablet Take 1 tablet by mouth daily (with dinner)       atorvastatin (LIPITOR) 40 MG tablet Take 1 tablet (40 mg) by mouth every evening for 30 days 30 tablet 0     metoprolol  "succinate ER (TOPROL XL) 100 MG 24 hr tablet Take 1 tablet (100 mg) by mouth daily for 30 days 30 tablet 0     sertraline (ZOLOFT) 50 MG tablet Take 50 mg by mouth daily (Patient not taking: Reported on 8/19/2024)       No current facility-administered medications for this visit.            Physical Examination     Physical Exam    Estimated body mass index is 28.55 kg/m  as calculated from the following:    Height as of this encounter: 1.924 m (6' 3.75\").    Weight as of this encounter: 105.7 kg (233 lb).    /77   Pulse 64   Ht 1.924 m (6' 3.75\")   Wt 105.7 kg (233 lb)   SpO2 97%   BMI 28.55 kg/m         General Exam  General: Sitting up in chair in no acute distress   Pulmonary:  no respiratory distress    Neurologic:  Mental Status:  alert, oriented x 3, follows commands, speech clear and fluent, naming and repetition normal  Cranial Nerves:  visual fields intact, PERRL, EOMI with normal smooth pursuit, facial sensation intact and symmetric, subtle flattening of R nasolabial fold but no significant weakness with muscle activation, hearing not formally tested but intact to conversation, palate elevation symmetric and uvula midline, no dysarthria, shoulder shrug strong bilaterally, tongue protrusion midline  Motor:  normal muscle tone and bulk, no abnormal movements, able to move all limbs spontaneously  Deltoid: 5/5 R, 5/5 L  Bicep: 5/5 R, 5/5 L  Ticep: 5/5 R, 5/5 L    Hip flexion: 4+/5 R, 5/5 L  Leg extension: 5/5 R, 5/5 L  Leg flexion: 5/5 R, 5/5 L  Foot extension: 5/5 R, 5/5 L  Foot flexion: 5/5 R, 5/5 L    Sensory:  light touch sensation intact and symmetric throughout upper and lower extremities, no extinction on double simultaneous stimulation   Coordination:  normal finger-to-nose and heel-to-shin bilaterally without dysmetria  Station/Gait:  deferred           Screenings and Questionnaires:     Tobacco:    Tobacco Use      Smoking status: Never      Smokeless tobacco: Never      Sleep Apnea:   "     Depression:      8/19/2024     7:48 AM   PHQ-2 ( 1999 [employer])   Q1: Little interest or pleasure in doing things 1   Q2: Feeling down, depressed or hopeless 1   PHQ-2 Score 2   Q1: Little interest or pleasure in doing things Several days   Q2: Feeling down, depressed or hopeless Several days   PHQ-2 Score 2       Stroke Recovery and Risk Factors:      8/19/2024     7:55 AM   Stroke Questionnaire   Residual effects: Any residual effects from stroke? I have some symptoms, but I'm not sure if they're residual effects of the stroke   Residual effects: Describe memory concerns   Level of independence: Could you live alone? Yes   Quality of Life: Rating (0-100%) 80   Quality of Life: What work now or before stroke Employed full time   Quality of Life: Current work status Currently working   Quality of Life: How do you get around Drive myself   Quality of Life: Living situation before stroke With family or significant other   Quality of Life: Living situation now With family or significant other   Medication: How do you take your meds Myself, from a pillbox that I set up   Medication: Do you ever miss or forget meds Rarely   Risk Factor: Checking blood pressure at home Yes   Risk Factor: Usual blood pressure numbers 123 /80   Risk Factor: Checking blood sugar at home No   Risk Factor: Second-hand smoke at home No   Risk Factor: How much caffeine per day very little currently   Who completed this questionnaire? The patient independently             Assessment and Plan   Ischemic stroke of left middle cerebral artery due to cardioembolism in the setting of atrial fibrillation not on anticoagulation vs paradoxical embolus, June 2024    RLE DVT & PE, June 2024    Small PFO; structural cardiology recommends against closure as long as patient is on anticoagulation    -continue Eliquis 5mg BID indefinitely for secondary stroke prevention  -Goal BP is <130/80 with tighter control associated with improved vascular outcomes;  recommend home blood pressure monitoring and keeping a BP log for primary care follow up  -continue Lipitor with ongoing titration for LDL goal 40-70; recheck LDL in coming 3-6 months for dose titration   -start zoloft for mood support; 25mg x2 weeks then increase to 50mg     - Return in about 3 months (around 11/19/2024) for stroke, with GARRICK Dodson only, 60 min.     Stroke Education provided.  He will call us with any questions.  For any acute neurologic deficits he was advised to  go directly to the hospital rather than call the clinic.      FE Varner CNP  Neurology  08/19/2024         ____________________________________________________________________    Billing:  Please note: for coding purposes this visit should be billed only as established and not new, since this patient was seen by my same subspecialty team (ealth Vascular Neurology) during the hospitalization that this visit is a follow up for.            Again, thank you for allowing me to participate in the care of your patient.        Sincerely,        FE Varner CNP

## 2024-08-19 NOTE — NURSING NOTE
Symptoms or concerns today: Cognitive and memory    Med comments: Says still on atorvastatin    Who the patient is here with today: Spouse - Jodi Woods

## 2024-08-19 NOTE — PATIENT INSTRUCTIONS
"-continue Eliquis 5mg BID indefinitely for secondary stroke prevention  -->if it is ever recommended to stop blood thinners, please discuss with the stroke team  -start zoloft/sertraline for the post stroke \"lilly\":  -->start 1 tab (25mg) daily for 2 weeks, then increase to 2 tabs (50mg) daily  -->we can increase further if you are tolerating the medication after 4 weeks; please keep me updated  -Goal BP is <130/80 with tighter control associated with improved vascular outcomes; recommend home blood pressure monitoring and keeping a BP log for primary care follow up  -continue Lipitor with ongoing titration for LDL goal 40-70; recheck LDL in coming 3-6 months for dose adjustment as needed  -follow up with me in 3 months, sooner if needed      "

## 2024-09-05 ENCOUNTER — TELEPHONE (OUTPATIENT)
Dept: NEUROLOGY | Facility: CLINIC | Age: 59
End: 2024-09-05
Payer: COMMERCIAL

## 2024-09-05 NOTE — TELEPHONE ENCOUNTER
Left detailed VM for patient, let him know that Barbi Dodson's November/December scheduling template has not yet been released. I checked recall request and assured him he would get a call for scheduling once that template has been released. Asked him to call with questions/ concerns in the mean time.    COLTON ESCAMILLA, CMA

## 2024-09-05 NOTE — TELEPHONE ENCOUNTER
[facility] Call Center     Phone Message     May a detailed message be left on voicemail: yes      Reason for Call: Patient requests a call back as   Patient would like to schedule a return stroke  With Barbi Dodson  Pt only wants to see that provider. Providers Template is not available past 11/5/24 and Pt states he needs his follow up in Nov/ Dec.      Please contact Patient to schedule at [phone]     Action Taken: CS Neurology

## 2024-09-05 NOTE — TELEPHONE ENCOUNTER
Message was left for patient to call back and schedule a follow up visit with Stroke CAIO at the [facility].     Patient advised to call [phone]

## 2024-09-05 NOTE — TELEPHONE ENCOUNTER
[facility] Call Center    Phone Message    May a detailed message be left on voicemail: yes     Reason for Call: Patient requests a call back  Patient would like to schedule a return stroke  With Barbi Dodson  Template only available thru 11/5/24    Patient needs December appointment    Action Taken: CS Neurology        Date of Service:

## 2024-09-13 ENCOUNTER — PATIENT OUTREACH (OUTPATIENT)
Dept: CARE COORDINATION | Facility: CLINIC | Age: 59
End: 2024-09-13
Payer: COMMERCIAL

## 2024-09-13 NOTE — PROGRESS NOTES
Stroke RN Care Coordination - 90 Day Modified [place] Note     SITUATION     Dave Cole is a 59 year old male who is receiving support for:  Stroke (90 Day mRS)    BACKGROUND     Dave Cole is a 59 year old male with past medical history significant for atrial fibrillation (not on anticoagulation) and thoracic aortic aneurysm who presents to the hospital for acute right-sided weakness at 2245 last night. Patient's wife reports weakness of his right face, upper and lower extremities at that time, as he had difficulty opening the bathroom door and getting into bed. Difficulty speaking and slurred speech are also noted. LKW at 2230 last night, while entering the bathroom. Symptoms reportedly improved in ED with TNKase per wife.     ASSESSMENT     Chart reviewed and pt noted to have completed stroke follow up visit on 8/19 with Melanie BOOKER where 90 day mRS was collected within the allowed timefram and noted to be 1.    PLAN     Follow-up plan:  No additional outreach needed at this time.    Pia Drake BS, RN, SCRN  RN Stroke Neurology Care Coordinator  [facility] Neuro[facility] Service Line

## 2024-10-10 ENCOUNTER — TELEPHONE (OUTPATIENT)
Dept: NEUROLOGY | Facility: CLINIC | Age: 59
End: 2024-10-10
Payer: COMMERCIAL

## 2024-10-10 NOTE — TELEPHONE ENCOUNTER
"Called pt to schedule stroke return visit, left message to call back. Patient was seen by the stroke team about 3 months ago, at that time it was recommended patient follow up with the stroke team to check in, review plan and optimize overall stroke risk management and we would like to offer an appointment.     When patient calls back: please schedule Stroke Return with Barbi Dodson CNP however if no availability with this provider, ok to schedule with a different stroke CAIO, any visit type as per patient preference, appointment notes \"3  month follow up for stroke\".    COLTON ESCAMILLA CMA    "

## 2024-11-13 ENCOUNTER — ANCILLARY PROCEDURE (OUTPATIENT)
Dept: RADIOLOGY | Facility: CLINIC | Age: 59
End: 2024-11-13
Attending: PSYCHIATRY & NEUROLOGY
Payer: COMMERCIAL

## 2024-11-13 ENCOUNTER — ANCILLARY PROCEDURE (OUTPATIENT)
Dept: MRI IMAGING | Facility: CLINIC | Age: 59
End: 2024-11-13
Attending: PSYCHIATRY & NEUROLOGY

## 2024-11-13 DIAGNOSIS — Z00.6 EXAMINATION OF PARTICIPANT IN CLINICAL TRIAL: ICD-10-CM

## 2024-11-13 DIAGNOSIS — Z00.6 EXAMINATION OF PARTICIPANT IN CLINICAL TRIAL: Primary | ICD-10-CM

## 2024-11-27 NOTE — PROGRESS NOTES
"__________________________________________________________      [facility] Sheyla Calles   [phone]  __________________________________________________________         History of Present Illness   Chief Complaint: Patient presents with:  RECHECK: Review the MRI      Dave Cole is a 59 year old male presenting for follow-up for L MCA stroke.     He was hospitalized at [facility] on 6/2/24. Prior to the hospital stay, he had a past medical history of atrial fibrillation not on AC and thoracic aortic aneurysm. He presented to the hospital with acute right face/arm/leg weakness, speech change. CTA with L MCA sub occlusive thrombus; given TNK but thrombectomy deferred due to low NIH=1. MRI with L basal ganglia and inusla infarcts, felt secondary to cardioembolism given afib not on AC. He was started on Eliquis and Lipitor for stroke prevention.  After discharging from [facility] he was admitted within the [facility] system 6/11 after he was found to have extensive right leg DVT and subsequently had a pulmonary embolus identified.  He then underwent SHAHNAZ and was found to have a small PFO; he met with structural cardiology who recommended against PFO closure given the small size and the fact that lifelong anticoagulation was already being recommended.  He reports that he also underwent hypercoagulable testing which was unrevealing.  He was on Lovenox for a short time, but is now back on Eliquis with recommendation for lifelong anticoagulation.     Most recent stroke follow-up was with myself 8/19/2024.  At this time he reported good recovery from stroke with only subtle right facial asymmetry and occasional difficulty with word finding.  He did feel some worsening of memory following his stroke, along with a feeling of \"apathy or malaise.\"  It was recommended that he continue Eliquis, Lipitor and blood pressure management.  In addition he was started on low-dose sertraline for mood " support.    Today, he reports that he has been doing well since her most recent visit.  He reports ongoing improvement in speech changes, to the point that the symptoms are very subtle to almost not noticeable.  No residual right sided weakness.  He feels that his memory is stable; he is enrolled in the discovery study and had a recent brain MRI with no new urgent findings.  He reports that Zoloft has been very helpful for stabilizing his mood and he has not identified any side effects.  He denies any new concerns today.    He continues on Eliquis and Lipitor for secondary stroke prevention.  He denies any concern of side effect.  He is checking blood pressure approximately once per week with averages in the 120s/70s.    He continues following with the thrombosis clinic and cardiology.        Modified Racquel Scale  Score: 0-No symptoms    Stroke Evaluation Summarized:  New results resulted and reviewed by me today are in BOLD below.      MRI/Head CT MRI: acute infarcts within the left basal ganglia and insula.   Intracranial Vasculature CTA head 6/2:  Filling defect 4 mm in length M1 segment left middle cerebral artery  Vessel cutoff at junction of the posterior division branch M2 and M3 region of the left middle cerebral artery with no contrast distally  CTA head 6/3:   1. Resolution of previously seen filling defects in the left M1 and more distal MCA branches. No definite new filling defect/occlusion.    Cervical Vasculature CTA neck without significant stenosis or dissection       Echocardiogram Ejection fraction 55-60%. Aortic root aneurysm present. Rhythm was atrial fibrillation. Mild concentric left ventricular hypertrophy.   EKG/Telemetry Afib with RVR with PVC or ACC   Other Testing Not Applicable     Labs Lab Results   Component Value Date     (H) 06/02/2024    A1C 5.1 06/02/2024    CTROPT 49 (H) 06/03/2024    INR 1.14 06/04/2024    INR 1.15 06/02/2024               Home Medications     Current  "Outpatient Medications   Medication Sig Dispense Refill    apixaban ANTICOAGULANT (ELIQUIS) 5 MG tablet Take 5 mg by mouth 2 times daily      fish oil-omega-3 fatty acids 1000 MG capsule Take 1 g by mouth daily (with dinner)      multivitamin, therapeutic (THERA-VIT) TABS tablet Take 1 tablet by mouth daily (with dinner)      sertraline (ZOLOFT) 25 MG tablet Take 1 tablet (25 mg) by mouth daily Take 1 tablet by mouth daily in the morning for 2 weeks; may increase to 2 tablets daily after 2 weeks if desired/tolerated. 60 tablet 3    Vitamin D3 (CHOLECALCIFEROL) 25 mcg (1000 units) tablet Take 1 tablet by mouth daily (with dinner)      atorvastatin (LIPITOR) 40 MG tablet Take 1 tablet (40 mg) by mouth every evening for 30 days 30 tablet 0    metoprolol succinate ER (TOPROL XL) 100 MG 24 hr tablet Take 1 tablet (100 mg) by mouth daily for 30 days 30 tablet 0    sertraline (ZOLOFT) 50 MG tablet Take 50 mg by mouth daily (Patient not taking: Reported on 8/19/2024)       No current facility-administered medications for this visit.            Physical Examination     Physical Exam    Estimated body mass index is 28.55 kg/m  as calculated from the following:    Height as of 8/19/24: 1.924 m (6' 3.75\").    Weight as of 8/19/24: 105.7 kg (233 lb).    /74   Pulse 85   SpO2 98%        General Exam  General: Sitting up in chair in no acute distress  Pulmonary:  no respiratory distress    Neurologic:  Mental Status:  alert, oriented x 3, follows commands, speech clear and fluent  Cranial Nerves:  EOMI with normal smooth pursuit, facial movements symmetric, hearing not formally tested but intact to conversation, no dysarthria  Motor:  normal muscle tone and bulk, no abnormal movements, able to move all limbs spontaneously           Screenings and Questionnaires:     Tobacco:    Tobacco Use      Smoking status: Never      Smokeless tobacco: Never      Sleep Apnea:       Depression:      8/19/2024     7:48 AM   PHQ-2 ( 1999 " [employer])   Q1: Little interest or pleasure in doing things 1    Q2: Feeling down, depressed or hopeless 1    PHQ-2 Score 2   Q1: Little interest or pleasure in doing things Several days   Q2: Feeling down, depressed or hopeless Several days   PHQ-2 Score 2       Patient-reported       Stroke Recovery and Risk Factors:      8/19/2024     7:55 AM   Stroke Questionnaire   Residual effects: Any residual effects from stroke? I have some symptoms, but I'm not sure if they're residual effects of the stroke    Residual effects: Describe memory concerns    Level of independence: Could you live alone? Yes    Quality of Life: Rating (0-100%) 80    Quality of Life: What work now or before stroke Employed full time    Quality of Life: Current work status Currently working    Quality of Life: How do you get around Drive myself    Quality of Life: Living situation before stroke With family or significant other    Quality of Life: Living situation now With family or significant other    Medication: How do you take your meds Myself, from a pillbox that I set up    Medication: Do you ever miss or forget meds Rarely    Risk Factor: Checking blood pressure at home Yes    Risk Factor: Usual blood pressure numbers 123 /80    Risk Factor: Checking blood sugar at home No    Risk Factor: Second-hand smoke at home No    Risk Factor: How much caffeine per day very little currently    Who completed this questionnaire? The patient independently        Patient-reported             Assessment and Plan   Ischemic stroke of left middle cerebral artery due to cardioembolism in the setting of atrial fibrillation not on anticoagulation vs paradoxical embolus, June 2024     RLE DVT & PE, June 2024     Small PFO; structural cardiology recommends against closure as long as patient is on anticoagulation     -continue Eliquis 5mg BID indefinitely for secondary stroke prevention  -Goal BP is <130/80 with tighter control associated with improved vascular  outcomes; recommend home blood pressure monitoring and keeping a BP log for primary care follow up  -continue Lipitor with ongoing titration for LDL goal 40-70; recheck LDL in coming 3-6 months for dose titration   -continue zoloft 50mg daily; discussed therapy for at least 6 months and then could trial reduction/discontinuation per his preference and symptom stability   -continue following with thrombosis clinic and cardiology     - Return for as needed. Per patient preference     Stroke Education provided.  He will call us with any questions.  For any acute neurologic deficits he was advised to  go directly to the hospital rather than call the clinic.      FE Varner [facility]  Neurology  12/02/2024         ____________________________________________________________________    Billing:  Please note: for coding purposes this visit should be billed only as established and not new, since this patient was seen by my same subspecialty team ([facility] Neurology) during the hospitalization that this visit is a follow up for.  I spent a total of 20 minutes on the day of the visit.   Time spent by me today doing chart review, history and exam, documentation and further activities per the note

## 2024-12-02 ENCOUNTER — OFFICE VISIT (OUTPATIENT)
Dept: NEUROLOGY | Facility: CLINIC | Age: 59
End: 2024-12-02
Payer: COMMERCIAL

## 2024-12-02 VITALS — DIASTOLIC BLOOD PRESSURE: 74 MMHG | HEART RATE: 85 BPM | OXYGEN SATURATION: 98 % | SYSTOLIC BLOOD PRESSURE: 121 MMHG

## 2024-12-02 DIAGNOSIS — I63.9 CEREBROVASCULAR ACCIDENT (CVA), UNSPECIFIED MECHANISM (H): Primary | ICD-10-CM

## 2024-12-02 NOTE — NURSING NOTE
Symptoms or concerns today: MRI review    Med comments: Still on Atorvastatin    Who the patient is here with today: Alone    Kike Woods

## 2024-12-02 NOTE — LETTER
12/2/2024      Dave Cole  [address]  [place] [address]      Dear Colleague,    Thank you for referring your patient, Dave Cole, to the [facility] NEUROLOGY [facility]. Please see a copy of my visit note below.    __________________________________________________________      [facility] Neurology [facility]   [phone]  __________________________________________________________         History of Present Illness   Chief Complaint: Patient presents with:  RECHECK: Review the MRI      Dave Cole is a 59 year old male presenting for follow-up for L MCA stroke.     He was hospitalized at [facility] on 6/2/24. Prior to the hospital stay, he had a past medical history of atrial fibrillation not on AC and thoracic aortic aneurysm. He presented to the hospital with acute right face/arm/leg weakness, speech change. CTA with L MCA sub occlusive thrombus; given TNK but thrombectomy deferred due to low NIH=1. MRI with L basal ganglia and inusla infarcts, felt secondary to cardioembolism given afib not on AC. He was started on Eliquis and Lipitor for stroke prevention.  After discharging from [facility] he was admitted within the [facility] system 6/11 after he was found to have extensive right leg DVT and subsequently had a pulmonary embolus identified.  He then underwent SHAHNAZ and was found to have a small PFO; he met with structural cardiology who recommended against PFO closure given the small size and the fact that lifelong anticoagulation was already being recommended.  He reports that he also underwent hypercoagulable testing which was unrevealing.  He was on Lovenox for a short time, but is now back on Eliquis with recommendation for lifelong anticoagulation.     Most recent stroke follow-up was with myself 8/19/2024.  At this time he reported good recovery from stroke with only subtle right facial asymmetry and occasional difficulty with word finding.  He did feel some  "worsening of memory following his stroke, along with a feeling of \"apathy or malaise.\"  It was recommended that he continue Eliquis, Lipitor and blood pressure management.  In addition he was started on low-dose sertraline for mood support.    Today, he reports that he has been doing well since her most recent visit.  He reports ongoing improvement in speech changes, to the point that the symptoms are very subtle to almost not noticeable.  No residual right sided weakness.  He feels that his memory is stable; he is enrolled in the discovery study and had a recent brain MRI with no new urgent findings.  He reports that Zoloft has been very helpful for stabilizing his mood and he has not identified any side effects.  He denies any new concerns today.    He continues on Eliquis and Lipitor for secondary stroke prevention.  He denies any concern of side effect.  He is checking blood pressure approximately once per week with averages in the 120s/70s.    He continues following with the thrombosis clinic and cardiology.        Modified Troup Scale  Score: 0-No symptoms    Stroke Evaluation Summarized:  New results resulted and reviewed by me today are in BOLD below.      MRI/Head CT MRI: acute infarcts within the left basal ganglia and insula.   Intracranial Vasculature CTA head 6/2:  Filling defect 4 mm in length M1 segment left middle cerebral artery  Vessel cutoff at junction of the posterior division branch M2 and M3 region of the left middle cerebral artery with no contrast distally  CTA head 6/3:   1. Resolution of previously seen filling defects in the left M1 and more distal MCA branches. No definite new filling defect/occlusion.    Cervical Vasculature CTA neck without significant stenosis or dissection       Echocardiogram Ejection fraction 55-60%. Aortic root aneurysm present. Rhythm was atrial fibrillation. Mild concentric left ventricular hypertrophy.   EKG/Telemetry Afib with RVR with PVC or ACC   Other " "Testing Not Applicable     Labs Lab Results   Component Value Date     (H) 06/02/2024    A1C 5.1 06/02/2024    CTROPT 49 (H) 06/03/2024    INR 1.14 06/04/2024    INR 1.15 06/02/2024               Home Medications     Current Outpatient Medications   Medication Sig Dispense Refill     apixaban ANTICOAGULANT (ELIQUIS) 5 MG tablet Take 5 mg by mouth 2 times daily       fish oil-omega-3 fatty acids 1000 MG capsule Take 1 g by mouth daily (with dinner)       multivitamin, therapeutic (THERA-VIT) TABS tablet Take 1 tablet by mouth daily (with dinner)       sertraline (ZOLOFT) 25 MG tablet Take 1 tablet (25 mg) by mouth daily Take 1 tablet by mouth daily in the morning for 2 weeks; may increase to 2 tablets daily after 2 weeks if desired/tolerated. 60 tablet 3     Vitamin D3 (CHOLECALCIFEROL) 25 mcg (1000 units) tablet Take 1 tablet by mouth daily (with dinner)       atorvastatin (LIPITOR) 40 MG tablet Take 1 tablet (40 mg) by mouth every evening for 30 days 30 tablet 0     metoprolol succinate ER (TOPROL XL) 100 MG 24 hr tablet Take 1 tablet (100 mg) by mouth daily for 30 days 30 tablet 0     sertraline (ZOLOFT) 50 MG tablet Take 50 mg by mouth daily (Patient not taking: Reported on 8/19/2024)       No current facility-administered medications for this visit.            Physical Examination     Physical Exam    Estimated body mass index is 28.55 kg/m  as calculated from the following:    Height as of 8/19/24: 1.924 m (6' 3.75\").    Weight as of 8/19/24: 105.7 kg (233 lb).    /74   Pulse 85   SpO2 98%        General Exam  General: Sitting up in chair in no acute distress  Pulmonary:  no respiratory distress    Neurologic:  Mental Status:  alert, oriented x 3, follows commands, speech clear and fluent  Cranial Nerves:  EOMI with normal smooth pursuit, facial movements symmetric, hearing not formally tested but intact to conversation, no dysarthria  Motor:  normal muscle tone and bulk, no abnormal movements, " able to move all limbs spontaneously           Screenings and Questionnaires:     Tobacco:    Tobacco Use      Smoking status: Never      Smokeless tobacco: Never      Sleep Apnea:       Depression:      8/19/2024     7:48 AM   PHQ-2 ( 1999 [employer])   Q1: Little interest or pleasure in doing things 1    Q2: Feeling down, depressed or hopeless 1    PHQ-2 Score 2   Q1: Little interest or pleasure in doing things Several days   Q2: Feeling down, depressed or hopeless Several days   PHQ-2 Score 2       Patient-reported       Stroke Recovery and Risk Factors:      8/19/2024     7:55 AM   Stroke Questionnaire   Residual effects: Any residual effects from stroke? I have some symptoms, but I'm not sure if they're residual effects of the stroke    Residual effects: Describe memory concerns    Level of independence: Could you live alone? Yes    Quality of Life: Rating (0-100%) 80    Quality of Life: What work now or before stroke Employed full time    Quality of Life: Current work status Currently working    Quality of Life: How do you get around Drive myself    Quality of Life: Living situation before stroke With family or significant other    Quality of Life: Living situation now With family or significant other    Medication: How do you take your meds Myself, from a pillbox that I set up    Medication: Do you ever miss or forget meds Rarely    Risk Factor: Checking blood pressure at home Yes    Risk Factor: Usual blood pressure numbers 123 /80    Risk Factor: Checking blood sugar at home No    Risk Factor: Second-hand smoke at home No    Risk Factor: How much caffeine per day very little currently    Who completed this questionnaire? The patient independently        Patient-reported             Assessment and Plan   Ischemic stroke of left middle cerebral artery due to cardioembolism in the setting of atrial fibrillation not on anticoagulation vs paradoxical embolus, June 2024     RLE DVT & PE, June 2024     Small PFO;  structural cardiology recommends against closure as long as patient is on anticoagulation     -continue Eliquis 5mg BID indefinitely for secondary stroke prevention  -Goal BP is <130/80 with tighter control associated with improved vascular outcomes; recommend home blood pressure monitoring and keeping a BP log for primary care follow up  -continue Lipitor with ongoing titration for LDL goal 40-70; recheck LDL in coming 3-6 months for dose titration   -continue zoloft 50mg daily; discussed therapy for at least 6 months and then could trial reduction/discontinuation per his preference and symptom stability   -continue following with thrombosis clinic and cardiology     - Return for as needed. Per patient preference     Stroke Education provided.  He will call us with any questions.  For any acute neurologic deficits he was advised to  go directly to the hospital rather than call the clinic.      FE Varner CNP  Neurology  12/02/2024         ____________________________________________________________________    Billing:  Please note: for coding purposes this visit should be billed only as established and not new, since this patient was seen by my same subspecialty team ([facility]) during the hospitalization that this visit is a follow up for.  I spent a total of 20 minutes on the day of the visit.   Time spent by me today doing chart review, history and exam, documentation and further activities per the note          Again, thank you for allowing me to participate in the care of your patient.        Sincerely,        FE Varner CNP

## 2025-07-05 ENCOUNTER — HEALTH MAINTENANCE LETTER (OUTPATIENT)
Age: 60
End: 2025-07-05